# Patient Record
Sex: FEMALE | Race: BLACK OR AFRICAN AMERICAN | NOT HISPANIC OR LATINO | URBAN - METROPOLITAN AREA
[De-identification: names, ages, dates, MRNs, and addresses within clinical notes are randomized per-mention and may not be internally consistent; named-entity substitution may affect disease eponyms.]

---

## 2017-07-31 ENCOUNTER — INPATIENT (INPATIENT)
Facility: HOSPITAL | Age: 62
LOS: 1 days | Discharge: HOME | DRG: 603 | End: 2017-08-02
Attending: HOSPITALIST | Admitting: HOSPITALIST
Payer: COMMERCIAL

## 2017-07-31 VITALS
WEIGHT: 205.03 LBS | OXYGEN SATURATION: 99 % | RESPIRATION RATE: 18 BRPM | TEMPERATURE: 100 F | SYSTOLIC BLOOD PRESSURE: 172 MMHG | HEIGHT: 64 IN | DIASTOLIC BLOOD PRESSURE: 91 MMHG | HEART RATE: 75 BPM

## 2017-07-31 DIAGNOSIS — L03.213 PERIORBITAL CELLULITIS: ICD-10-CM

## 2017-07-31 LAB
ALBUMIN SERPL ELPH-MCNC: 3.7 G/DL — SIGNIFICANT CHANGE UP (ref 3.5–5)
ALP SERPL-CCNC: 120 U/L — SIGNIFICANT CHANGE UP (ref 40–120)
ALT FLD-CCNC: 24 U/L DA — SIGNIFICANT CHANGE UP (ref 10–60)
ANION GAP SERPL CALC-SCNC: 8 MMOL/L — SIGNIFICANT CHANGE UP (ref 5–17)
APTT BLD: 32.5 SEC — SIGNIFICANT CHANGE UP (ref 27.5–37.4)
AST SERPL-CCNC: 21 U/L — SIGNIFICANT CHANGE UP (ref 10–40)
BASOPHILS # BLD AUTO: 0.1 K/UL — SIGNIFICANT CHANGE UP (ref 0–0.2)
BASOPHILS NFR BLD AUTO: 1.2 % — SIGNIFICANT CHANGE UP (ref 0–2)
BILIRUB SERPL-MCNC: 0.2 MG/DL — SIGNIFICANT CHANGE UP (ref 0.2–1.2)
BUN SERPL-MCNC: 14 MG/DL — SIGNIFICANT CHANGE UP (ref 7–18)
CALCIUM SERPL-MCNC: 8.9 MG/DL — SIGNIFICANT CHANGE UP (ref 8.4–10.5)
CHLORIDE SERPL-SCNC: 103 MMOL/L — SIGNIFICANT CHANGE UP (ref 96–108)
CO2 SERPL-SCNC: 27 MMOL/L — SIGNIFICANT CHANGE UP (ref 22–31)
CREAT SERPL-MCNC: 0.93 MG/DL — SIGNIFICANT CHANGE UP (ref 0.5–1.3)
EOSINOPHIL # BLD AUTO: 0.2 K/UL — SIGNIFICANT CHANGE UP (ref 0–0.5)
EOSINOPHIL NFR BLD AUTO: 2.6 % — SIGNIFICANT CHANGE UP (ref 0–6)
GLUCOSE SERPL-MCNC: 103 MG/DL — HIGH (ref 70–99)
HCG SERPL-ACNC: 1 MIU/ML — SIGNIFICANT CHANGE UP
HCG UR QL: NEGATIVE — SIGNIFICANT CHANGE UP
HCT VFR BLD CALC: 41.7 % — SIGNIFICANT CHANGE UP (ref 34.5–45)
HGB BLD-MCNC: 13.7 G/DL — SIGNIFICANT CHANGE UP (ref 11.5–15.5)
INR BLD: 0.92 RATIO — SIGNIFICANT CHANGE UP (ref 0.88–1.16)
LYMPHOCYTES # BLD AUTO: 1.8 K/UL — SIGNIFICANT CHANGE UP (ref 1–3.3)
LYMPHOCYTES # BLD AUTO: 26.1 % — SIGNIFICANT CHANGE UP (ref 13–44)
MCHC RBC-ENTMCNC: 28.2 PG — SIGNIFICANT CHANGE UP (ref 27–34)
MCHC RBC-ENTMCNC: 32.8 GM/DL — SIGNIFICANT CHANGE UP (ref 32–36)
MCV RBC AUTO: 86.2 FL — SIGNIFICANT CHANGE UP (ref 80–100)
MONOCYTES # BLD AUTO: 0.4 K/UL — SIGNIFICANT CHANGE UP (ref 0–0.9)
MONOCYTES NFR BLD AUTO: 5.4 % — SIGNIFICANT CHANGE UP (ref 2–14)
NEUTROPHILS # BLD AUTO: 4.4 K/UL — SIGNIFICANT CHANGE UP (ref 1.8–7.4)
NEUTROPHILS NFR BLD AUTO: 64.6 % — SIGNIFICANT CHANGE UP (ref 43–77)
PLATELET # BLD AUTO: 214 K/UL — SIGNIFICANT CHANGE UP (ref 150–400)
POTASSIUM SERPL-MCNC: 3.5 MMOL/L — SIGNIFICANT CHANGE UP (ref 3.5–5.3)
POTASSIUM SERPL-SCNC: 3.5 MMOL/L — SIGNIFICANT CHANGE UP (ref 3.5–5.3)
PROT SERPL-MCNC: 8 G/DL — SIGNIFICANT CHANGE UP (ref 6–8.3)
PROTHROM AB SERPL-ACNC: 10 SEC — SIGNIFICANT CHANGE UP (ref 9.8–12.7)
RBC # BLD: 4.84 M/UL — SIGNIFICANT CHANGE UP (ref 3.8–5.2)
RBC # FLD: 13.2 % — SIGNIFICANT CHANGE UP (ref 10.3–14.5)
SODIUM SERPL-SCNC: 138 MMOL/L — SIGNIFICANT CHANGE UP (ref 135–145)
WBC # BLD: 6.8 K/UL — SIGNIFICANT CHANGE UP (ref 3.8–10.5)
WBC # FLD AUTO: 6.8 K/UL — SIGNIFICANT CHANGE UP (ref 3.8–10.5)

## 2017-07-31 PROCEDURE — 70481 CT ORBIT/EAR/FOSSA W/DYE: CPT | Mod: 26

## 2017-07-31 PROCEDURE — 99285 EMERGENCY DEPT VISIT HI MDM: CPT

## 2017-07-31 RX ORDER — PIPERACILLIN AND TAZOBACTAM 4; .5 G/20ML; G/20ML
3.38 INJECTION, POWDER, LYOPHILIZED, FOR SOLUTION INTRAVENOUS ONCE
Qty: 0 | Refills: 0 | Status: COMPLETED | OUTPATIENT
Start: 2017-07-31 | End: 2017-07-31

## 2017-07-31 RX ORDER — ACETAMINOPHEN 500 MG
650 TABLET ORAL ONCE
Qty: 0 | Refills: 0 | Status: COMPLETED | OUTPATIENT
Start: 2017-07-31 | End: 2017-07-31

## 2017-07-31 RX ADMIN — PIPERACILLIN AND TAZOBACTAM 200 GRAM(S): 4; .5 INJECTION, POWDER, LYOPHILIZED, FOR SOLUTION INTRAVENOUS at 22:49

## 2017-07-31 NOTE — ED PROVIDER NOTE - SKIN, MLM
Skin normal color for race, warm, dry and intact. No evidence of rash. Skin normal color for race, warm, dry and intact. 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

## 2017-07-31 NOTE — ED PROVIDER NOTE - EYES, MLM
Clear bilaterally, pupils equal, round and reactive to light. Rt periorbital swelling with erythema, sclera- erythema, unable to close eye

## 2017-07-31 NOTE — ED PROVIDER NOTE - OBJECTIVE STATEMENT
Patient u Patient is a 62 year old female from home past medical history of hypertension and gout who  was sent from urgent care center for evaluation of swelling and redness of right eye. She said that she noticed her eye itch at 4pm and when she checked herself in the mirror it was red and swollen. She has had a similar episode in the past, applied vicks and 'it drained away overnight' as per patient. Reports subjective fever but no headache, discharge from eye, blurred vision, change in vision, chest pain, abdominal pain, nausea, vomiting or diarrhea.

## 2017-07-31 NOTE — ED ADULT NURSE NOTE - OBJECTIVE STATEMENT
Patient complains of itchiness, redness to right eye x today. No bleeding or discharge noted. Denies blurry vision, changes in vision. Moving all extremities. Breathing easy and unlabored, no use of accessory muscles.

## 2017-07-31 NOTE — ED PROVIDER NOTE - ATTENDING CONTRIBUTION TO CARE
62 y.o. female claims she thought she was bitten by something this afternoon, pt rubbed her Rt eye, noted periorbital edema, also sclera swell up, itchiness, no blurred vision, no pain, fever.  Seen @ Urgent Care, sent to ED to r/o orbital cellulitis 62 y.o. female claims she thought she was bitten by something this afternoon, pt rubbed her Rt eye, noted periorbital edema, also sclera swell up, itchiness, no blurred vision, no pain, fever.  Seen @ Urgent Care, sent to ED to r/o orbital cellulitis.  PE: in mild distress, Eye: Rt eye lat area tiny lac seen, no bleeding, EOMI, visual acuity- 20/20 with corrective glasses, NT to palp.,  pt with periorbital cellulitis, will get labs, CT orbit, give antibiotics

## 2017-08-01 DIAGNOSIS — I10 ESSENTIAL (PRIMARY) HYPERTENSION: ICD-10-CM

## 2017-08-01 DIAGNOSIS — Z29.9 ENCOUNTER FOR PROPHYLACTIC MEASURES, UNSPECIFIED: ICD-10-CM

## 2017-08-01 DIAGNOSIS — L03.213 PERIORBITAL CELLULITIS: ICD-10-CM

## 2017-08-01 LAB
ALBUMIN SERPL ELPH-MCNC: 3.1 G/DL — LOW (ref 3.5–5)
ALP SERPL-CCNC: 94 U/L — SIGNIFICANT CHANGE UP (ref 40–120)
ALT FLD-CCNC: 20 U/L DA — SIGNIFICANT CHANGE UP (ref 10–60)
ANION GAP SERPL CALC-SCNC: 7 MMOL/L — SIGNIFICANT CHANGE UP (ref 5–17)
AST SERPL-CCNC: 15 U/L — SIGNIFICANT CHANGE UP (ref 10–40)
BASOPHILS # BLD AUTO: 0.1 K/UL — SIGNIFICANT CHANGE UP (ref 0–0.2)
BASOPHILS NFR BLD AUTO: 1 % — SIGNIFICANT CHANGE UP (ref 0–2)
BILIRUB SERPL-MCNC: 0.4 MG/DL — SIGNIFICANT CHANGE UP (ref 0.2–1.2)
BUN SERPL-MCNC: 9 MG/DL — SIGNIFICANT CHANGE UP (ref 7–18)
CALCIUM SERPL-MCNC: 8.5 MG/DL — SIGNIFICANT CHANGE UP (ref 8.4–10.5)
CHLORIDE SERPL-SCNC: 108 MMOL/L — SIGNIFICANT CHANGE UP (ref 96–108)
CO2 SERPL-SCNC: 26 MMOL/L — SIGNIFICANT CHANGE UP (ref 22–31)
CREAT SERPL-MCNC: 0.78 MG/DL — SIGNIFICANT CHANGE UP (ref 0.5–1.3)
EOSINOPHIL # BLD AUTO: 0.2 K/UL — SIGNIFICANT CHANGE UP (ref 0–0.5)
EOSINOPHIL NFR BLD AUTO: 2.4 % — SIGNIFICANT CHANGE UP (ref 0–6)
GLUCOSE SERPL-MCNC: 95 MG/DL — SIGNIFICANT CHANGE UP (ref 70–99)
HCT VFR BLD CALC: 38.6 % — SIGNIFICANT CHANGE UP (ref 34.5–45)
HGB BLD-MCNC: 12.5 G/DL — SIGNIFICANT CHANGE UP (ref 11.5–15.5)
LYMPHOCYTES # BLD AUTO: 1.3 K/UL — SIGNIFICANT CHANGE UP (ref 1–3.3)
LYMPHOCYTES # BLD AUTO: 20 % — SIGNIFICANT CHANGE UP (ref 13–44)
MAGNESIUM SERPL-MCNC: 1.9 MG/DL — SIGNIFICANT CHANGE UP (ref 1.6–2.6)
MCHC RBC-ENTMCNC: 27.9 PG — SIGNIFICANT CHANGE UP (ref 27–34)
MCHC RBC-ENTMCNC: 32.4 GM/DL — SIGNIFICANT CHANGE UP (ref 32–36)
MCV RBC AUTO: 86.2 FL — SIGNIFICANT CHANGE UP (ref 80–100)
MONOCYTES # BLD AUTO: 0.5 K/UL — SIGNIFICANT CHANGE UP (ref 0–0.9)
MONOCYTES NFR BLD AUTO: 7.2 % — SIGNIFICANT CHANGE UP (ref 2–14)
NEUTROPHILS # BLD AUTO: 4.6 K/UL — SIGNIFICANT CHANGE UP (ref 1.8–7.4)
NEUTROPHILS NFR BLD AUTO: 69.3 % — SIGNIFICANT CHANGE UP (ref 43–77)
PHOSPHATE SERPL-MCNC: 3.1 MG/DL — SIGNIFICANT CHANGE UP (ref 2.5–4.5)
PLATELET # BLD AUTO: 174 K/UL — SIGNIFICANT CHANGE UP (ref 150–400)
POTASSIUM SERPL-MCNC: 3.3 MMOL/L — LOW (ref 3.5–5.3)
POTASSIUM SERPL-SCNC: 3.3 MMOL/L — LOW (ref 3.5–5.3)
PROT SERPL-MCNC: 7 G/DL — SIGNIFICANT CHANGE UP (ref 6–8.3)
RBC # BLD: 4.47 M/UL — SIGNIFICANT CHANGE UP (ref 3.8–5.2)
RBC # FLD: 13.2 % — SIGNIFICANT CHANGE UP (ref 10.3–14.5)
SODIUM SERPL-SCNC: 141 MMOL/L — SIGNIFICANT CHANGE UP (ref 135–145)
WBC # BLD: 6.6 K/UL — SIGNIFICANT CHANGE UP (ref 3.8–10.5)
WBC # FLD AUTO: 6.6 K/UL — SIGNIFICANT CHANGE UP (ref 3.8–10.5)

## 2017-08-01 RX ORDER — AMPICILLIN SODIUM AND SULBACTAM SODIUM 250; 125 MG/ML; MG/ML
3 INJECTION, POWDER, FOR SUSPENSION INTRAMUSCULAR; INTRAVENOUS ONCE
Qty: 0 | Refills: 0 | Status: COMPLETED | OUTPATIENT
Start: 2017-08-01 | End: 2017-08-01

## 2017-08-01 RX ORDER — COLCHICINE 0.6 MG
0.6 TABLET ORAL
Qty: 0 | Refills: 0 | Status: DISCONTINUED | OUTPATIENT
Start: 2017-08-01 | End: 2017-08-02

## 2017-08-01 RX ORDER — AMPICILLIN SODIUM AND SULBACTAM SODIUM 250; 125 MG/ML; MG/ML
INJECTION, POWDER, FOR SUSPENSION INTRAMUSCULAR; INTRAVENOUS
Qty: 0 | Refills: 0 | Status: DISCONTINUED | OUTPATIENT
Start: 2017-08-01 | End: 2017-08-02

## 2017-08-01 RX ORDER — AMPICILLIN SODIUM AND SULBACTAM SODIUM 250; 125 MG/ML; MG/ML
3 INJECTION, POWDER, FOR SUSPENSION INTRAMUSCULAR; INTRAVENOUS EVERY 6 HOURS
Qty: 0 | Refills: 0 | Status: DISCONTINUED | OUTPATIENT
Start: 2017-08-01 | End: 2017-08-02

## 2017-08-01 RX ORDER — POTASSIUM CHLORIDE 20 MEQ
40 PACKET (EA) ORAL EVERY 4 HOURS
Qty: 0 | Refills: 0 | Status: COMPLETED | OUTPATIENT
Start: 2017-08-01 | End: 2017-08-01

## 2017-08-01 RX ORDER — METOPROLOL TARTRATE 50 MG
50 TABLET ORAL DAILY
Qty: 0 | Refills: 0 | Status: DISCONTINUED | OUTPATIENT
Start: 2017-08-01 | End: 2017-08-02

## 2017-08-01 RX ADMIN — Medication 2.5 MILLIGRAM(S): at 23:28

## 2017-08-01 RX ADMIN — AMPICILLIN SODIUM AND SULBACTAM SODIUM 200 GRAM(S): 250; 125 INJECTION, POWDER, FOR SUSPENSION INTRAMUSCULAR; INTRAVENOUS at 23:28

## 2017-08-01 RX ADMIN — Medication 40 MILLIEQUIVALENT(S): at 16:07

## 2017-08-01 RX ADMIN — AMPICILLIN SODIUM AND SULBACTAM SODIUM 200 GRAM(S): 250; 125 INJECTION, POWDER, FOR SUSPENSION INTRAMUSCULAR; INTRAVENOUS at 18:00

## 2017-08-01 RX ADMIN — Medication 40 MILLIEQUIVALENT(S): at 11:59

## 2017-08-01 RX ADMIN — AMPICILLIN SODIUM AND SULBACTAM SODIUM 200 GRAM(S): 250; 125 INJECTION, POWDER, FOR SUSPENSION INTRAMUSCULAR; INTRAVENOUS at 12:00

## 2017-08-01 RX ADMIN — Medication 0.6 MILLIGRAM(S): at 08:16

## 2017-08-01 RX ADMIN — Medication 50 MILLIGRAM(S): at 08:16

## 2017-08-01 RX ADMIN — Medication 2.5 MILLIGRAM(S): at 13:45

## 2017-08-01 NOTE — H&P ADULT - NSHPPHYSICALEXAM_GEN_ALL_CORE
GENERAL: Well developed, Well nourished white female, NAD  HEENT:  Normocephalic/Atraumatic, reactive light reflex, moist mucous membranes, nonpalpable lymph nodes, no thyromegaly  NECK: Supple, no JVD  RESP: Symmetric movement of the chest, clear to auscultation bilaterally, no wheeze, no rhonchi, no crackles noted  CVS: + irregular rate and rhythm, S1 and S2 audible, no murmur, rubs or gallops noted  GI: Normal active bowel sounds present, abdomen soft, non tender, non distended, without scar marks, no hepatosleenomegaly  EXTREMITIES:  1+ edema, no clubbing, cyanosis , +varicosities/PVD noted  MSK: 5/5 strength bilateral upper and lower extremities, intact sensations to light & crude touch  PSYCH: Normal mood, normal affect observed  NEURO: alert and oriented x 3, gait steady, speech clear, no focal deficits noted  SKIN: Right groin procedure site CDI.  no bleeding, no hematoma, no rashes, site soft, non tender, positive pedal pulses bilaterally  TUBES: GENERAL: Well developed, Well nourished white female, NAD  HEENT:  Normocephalic/Atraumatic, reactive light reflex, erythematous Rt. conjunctiva, swollen right eyelid with perserved eye movements  NECK: Supple, no JVD  RESP: Symmetric movement of the chest, clear to auscultation bilaterally, no wheeze, no rhonchi, no crackles noted  CVS: S1 and S2 audible, no murmur, rubs or gallops noted  GI: Normal active bowel sounds present, abdomen soft, non tender, non distended  EXTREMITIES: no clubbing, cyanosis  MSK: 5/5 strength bilateral upper and lower extremities, intact sensations to light touch  PSYCH: Normal mood, normal affect observed  NEURO: alert and oriented x 3, gait steady, speech clear, no focal deficits noted GENERAL: Well developed, Well nourished white female, NAD  HEENT:  Normocephalic/Atraumatic, reactive light reflex, erythematous Rt. conjunctiva, swollen right eyelid with preserved eye movements  NECK: Supple, no JVD  RESP: Symmetric movement of the chest, clear to auscultation bilaterally, no wheeze, no rhonchi, no crackles noted  CVS: S1 and S2 audible, no murmur, rubs or gallops noted  GI: Normal active bowel sounds present, abdomen soft, non tender, non distended  EXTREMITIES: no clubbing, cyanosis  MSK: 5/5 strength bilateral upper and lower extremities, intact sensations to light touch  PSYCH: Normal mood, normal affect observed  NEURO: alert and oriented x 3, gait steady, speech clear, no focal deficits noted

## 2017-08-01 NOTE — H&P ADULT - HISTORY OF PRESENT ILLNESS
63 Yo Female from home with PMH of hypertension and gout who was sent from urgent care center for evaluation of swelling and redness of right eye. She said that she noticed her Rt eye itching started suddenly at 4pm on 7/31 and when she checked herself in the mirror it was red and swollen. The eye pain was non radiating, without alleviating and aggravating factors. Pt thinks it to be related to some insect bite but can't recall it properly. She has had a similar episode in the past, applied vicks and 'it drained away overnight' as per patient. Pt also has mild fevers without chills and rigors. She denies headache, discharge from eye, blurred vision, change in vision, difficulty in eye movement, chest pain, abdominal pain, nausea, vomiting or diarrhea.     Pt has a history of b/l LE cellulitis 9 years ago treated with antibiotics.     In ED her /91 with HR of 75. Temp 99 61 Yo Female from home with PMH of hypertension and gout who was sent from urgent care center for evaluation of swelling and redness of right eye. She said that she noticed her Rt eye itching started suddenly at 4pm on 7/31 and when she checked herself in the mirror it was red and swollen. The eye pain was non radiating, without alleviating and aggravating factors. Pt thinks it to be related to some insect bite but can't recall it properly. She has had a similar episode in the past, applied vicks and 'it drained away overnight' as per patient. Pt also has mild fevers without chills and rigors. She denies headache, discharge from eye, blurred vision, change in vision, difficulty in eye movement, chest pain, abdominal pain, nausea, vomiting or diarrhea.     Pt has a history of b/l LE cellulitis 9 years ago treated with antibiotics.     In ED her /91 with HR of 75. Temp 99. Given a dose of zosyn. 63 Yo Female from home with PMH of hypertension and gout who was sent from urgent care center for evaluation of swelling and redness of right eye. She said that she noticed her Rt eye itching started suddenly at 4pm on 7/31 and when she checked herself in the mirror it was red and swollen. The eye pain was non radiating, without alleviating and aggravating factors. There is some discharge and crusting. Pt thinks it to be related to some insect bite but can't recall it properly. She has had a similar episode in the past around 7 months ago, applied vicks and 'it drained away overnight' as per patient. Patient also has mild fevers without chills and rigors. She denies headache, blurred vision, change in vision, difficulty in eye movement, chest pain, abdominal pain, nausea, vomiting or diarrhea.     Pt has a history of b/l LE cellulitis 9 years ago treated with antibiotics.     In ED her /91 with HR of 75. Temp 99. Given a dose of zosyn.

## 2017-08-01 NOTE — H&P ADULT - NSHPREVIEWOFSYSTEMS_GEN_ALL_CORE
CONSTITUTIONAL: No weakness, mild fever  EYES/ENT: No visual changes, red swollen Rt eyelid;  No vertigo or throat pain   NECK: dryness of throat, No pain or stiffness  RESPIRATORY: No cough, wheezing, hemoptysis; No shortness of breath  CARDIOVASCULAR: No chest pain, mild palpitations  GASTROINTESTINAL: No abdominal or epigastric pain. No nausea, vomiting, or hematemesis; No diarrhea or constipation. No melena or hematochezia.  GENITOURINARY: No dysuria, frequency or hematuria  NEUROLOGICAL: No numbness or weakness, left knee and foot pain  SKIN: No itching, burning, rashes, or lesions   All other review of systems is negative unless indicated above. CONSTITUTIONAL: No weakness, mild fever  EYES/ENT: No visual changes, red swollen Rt eyelid with crusting;  No vertigo or throat pain   NECK: dryness of throat, No pain or stiffness  RESPIRATORY: No cough, wheezing, hemoptysis; No shortness of breath  CARDIOVASCULAR: No chest pain, mild palpitations  GASTROINTESTINAL: No abdominal or epigastric pain. No nausea, vomiting, or hematemesis; No diarrhea or constipation. No melena or hematochezia.  GENITOURINARY: No dysuria, frequency or hematuria  NEUROLOGICAL: No numbness or weakness, left knee and foot pain  SKIN: No itching, burning, rashes, or lesions   All other review of systems is negative unless indicated above.

## 2017-08-01 NOTE — CONSULT NOTE ADULT - SUBJECTIVE AND OBJECTIVE BOX
HPI:  Pt is a 63y/o Female, from home, with PMH of Vitiligo, hypertension and gout who presents from urgent care with swelling and redness of the right eye x 1 day. Pt states Rt eye became itchy yesterday afternoon with burning sensation, and when she looked in the mirror it was red and swollen.  Pt thinks it could be related to an insect bite, and states 7 months prior she had similar symptoms at which point she applied vicks vaporub and 'it drained away overnight'.  Patient also reports mild fevers without chills, or rigor, and denies any visual disturbances.  Today pt presents in no acute distress, resting comfortably in bed.  Pt states eye feels much better, and burning has subsided.  Pt still experiencing mild itching, but denies any visual disturbances, or pain.   Otherwise pt has no new complaints today, and denies headache, change in vision, difficulty in eye movement, chest pain, abdominal pain, or N/V/D.     PAST MEDICAL & SURGICAL HISTORY:  Psoriasis  Cellulitis: bilateral lower exts  HTN (hypertension)  Pt has a history of b/l LE cellulitis 9 years ago treated with antibiotics.     No significant past surgical history    No Known Allergies    MEDICATIONS  (STANDING):  metoprolol succinate ER 50 milliGRAM(s) Oral daily  enalapril 2.5 milliGRAM(s) Oral daily  ampicillin/sulbactam  IVPB   IV Intermittent   ampicillin/sulbactam  IVPB 3 Gram(s) IV Intermittent once  ampicillin/sulbactam  IVPB 3 Gram(s) IV Intermittent every 6 hours  potassium chloride    Tablet ER 40 milliEquivalent(s) Oral every 4 hours    MEDICATIONS  (PRN):  colchicine 0.6 milliGRAM(s) Oral two times a day PRN joint pain      SOCIAL HISTORY:  Smoker:  NO        ETOH use:  NO         :  Ilicit Drug use:  NO  Occupation:    FAMILY HISTORY:  Family history of hypertension (Mother)      Vital Signs Last 24 Hrs  T(C): 37 (01 Aug 2017 09:57), Max: 37.6 (31 Jul 2017 18:46)  T(F): 98.6 (01 Aug 2017 09:57), Max: 99.7 (31 Jul 2017 18:46)  HR: 81 (01 Aug 2017 09:57) (75 - 91)  BP: 167/92 (01 Aug 2017 09:57) (150/88 - 177/100)  BP(mean): --  RR: 16 (01 Aug 2017 09:57) (16 - 18)  SpO2: 98% (01 Aug 2017 09:57) (98% - 100%)    LABS/DIAGNOSTIC TESTS:                          12.5   6.6   )-----------( 174      ( 01 Aug 2017 10:53 )             38.6     WBC Count: 6.6 K/uL (08-01 @ 10:53)  WBC Count: 6.8 K/uL (07-31 @ 20:53)      08-01    141  |  108  |  9   ----------------------------<  95  3.3<L>   |  26  |  0.78    Ca    8.5      01 Aug 2017 08:43  Phos  3.1     08-01  Mg     1.9     08-01    TPro  7.0  /  Alb  3.1<L>  /  TBili  0.4  /  DBili  x   /  AST  15  /  ALT  20  /  AlkPhos  94  08-01        LIVER FUNCTIONS - ( 01 Aug 2017 08:43 )  Alb: 3.1 g/dL / Pro: 7.0 g/dL / ALK PHOS: 94 U/L / ALT: 20 U/L DA / AST: 15 U/L / GGT: x           PT/INR - ( 31 Jul 2017 20:53 )   PT: 10.0 sec;   INR: 0.92 ratio       PTT - ( 31 Jul 2017 20:53 )  PTT:32.5 sec    LACTATE:    ABG -     CULTURES:       RADIOLOGY:      ROS  [  ] UNABLE TO ELICIT HPI:  Pt is a 63y/o Female, from home, with PMH of Vitiligo, hypertension and gout who presents from urgent care with swelling and redness of the right eye x 1 day. Pt states Rt eye became itchy yesterday afternoon with burning sensation, and when she looked in the mirror it was red and swollen.  Pt thinks it could be related to an insect bite, and states 7 months prior she had similar symptoms at which point she applied vicks vaporub and 'it drained away overnight'.  Patient also reports mild fevers without chills, or rigor, and denies any visual disturbances.  Today pt presents in no acute distress, resting comfortably in bed.  Pt states eye feels much better, and burning has subsided.  Pt still experiencing mild itching, but denies any visual disturbances, or pain.   Otherwise pt has no new complaints today, and denies headache, change in vision, difficulty in eye movement, chest pain, abdominal pain, or N/V/D.     REVIEW OF SYSTEMS:  CONSTITUTIONAL: No fever,   EYES: no acute visual disturbances, no pain, +itching of rt eye  NECK: No pain or stiffness  RESPIRATORY: No cough; No shortness of breath  CARDIOVASCULAR: No chest pain, no palpitations  GASTROINTESTINAL: No pain. No nausea or vomiting; No diarrhea   NEUROLOGICAL: No headache or numbness, no tremors  MUSCULOSKELETAL: No joint pain, no muscle pain  SKIN: Vitiligo  GENITOURINARY: no dysuria, no frequency, no hesitancy  PSYCHIATRY: no depression , no anxiety  ALL OTHER  ROS negative        PAST MEDICAL & SURGICAL HISTORY:  Vitiligo  Psoriasis  Cellulitis: bilateral lower exts  HTN (hypertension)  Pt has a history of b/l LE cellulitis 9 years ago treated with antibiotics.     No significant past surgical history    No Known Allergies    MEDICATIONS  (STANDING):  metoprolol succinate ER 50 milliGRAM(s) Oral daily  enalapril 2.5 milliGRAM(s) Oral daily  ampicillin/sulbactam  IVPB   IV Intermittent   ampicillin/sulbactam  IVPB 3 Gram(s) IV Intermittent once  ampicillin/sulbactam  IVPB 3 Gram(s) IV Intermittent every 6 hours  potassium chloride    Tablet ER 40 milliEquivalent(s) Oral every 4 hours    MEDICATIONS  (PRN):  colchicine 0.6 milliGRAM(s) Oral two times a day PRN joint pain      SOCIAL HISTORY:  Smoker:  NO        ETOH use:  NO         :  Ilicit Drug use:  NO  Occupation:    FAMILY HISTORY:  Family history of hypertension (Mother)      Vital Signs Last 24 Hrs  T(C): 37 (01 Aug 2017 09:57), Max: 37.6 (31 Jul 2017 18:46)  T(F): 98.6 (01 Aug 2017 09:57), Max: 99.7 (31 Jul 2017 18:46)  HR: 81 (01 Aug 2017 09:57) (75 - 91)  BP: 167/92 (01 Aug 2017 09:57) (150/88 - 177/100)  BP(mean): --  RR: 16 (01 Aug 2017 09:57) (16 - 18)  SpO2: 98% (01 Aug 2017 09:57) (98% - 100%)  ________________________________________________  PHYSICAL EXAM:  GENERAL: NAD  HEENT: Normocephalic; erythematous conjunctivae, sclerae clear; bro orbital erythema; vision intact;  moist mucous membranes;   NECK : supple  CHEST/LUNG: Clear to auscultation bilaterally with good air entry   HEART: S1 S2  regular; no murmurs, gallops or rubs  ABDOMEN: Soft, Nontender, Nondistended; Bowel sounds present  EXTREMITIES: no cyanosis; no edema; no calf tenderness  SKIN: Vitiligo; Bilateral lower leg keloid scars from prior cellulitic episode  NERVOUS SYSTEM:  Awake and alert; Oriented  to place, person and time ; no new deficits    LABS/DIAGNOSTIC TESTS:                      12.5   6.6   )-----------( 174      ( 01 Aug 2017 10:53 )             38.6     WBC Count: 6.6 K/uL (08-01 @ 10:53)  WBC Count: 6.8 K/uL (07-31 @ 20:53)      08-01    141  |  108  |  9   ----------------------------<  95  3.3<L>   |  26  |  0.78    Ca    8.5      01 Aug 2017 08:43  Phos  3.1     08-01  Mg     1.9     08-01    TPro  7.0  /  Alb  3.1<L>  /  TBili  0.4  /  DBili  x   /  AST  15  /  ALT  20  /  AlkPhos  94  08-01        LIVER FUNCTIONS - ( 01 Aug 2017 08:43 )  Alb: 3.1 g/dL / Pro: 7.0 g/dL / ALK PHOS: 94 U/L / ALT: 20 U/L DA / AST: 15 U/L / GGT: x           PT/INR - ( 31 Jul 2017 20:53 )   PT: 10.0 sec;   INR: 0.92 ratio       PTT - ( 31 Jul 2017 20:53 )  PTT:32.5 sec    CULTURES: Pending       RADIOLOGY:  CT Orbit w/ IV Cont (07.31.17 @ 22:09) >    IMPRESSION:   Mild right preseptal, inferior periorbital cellulitis. No evidence of a   postseptal cellulitis.          ROS  [  ] UNABLE TO ELICIT HPI:  Pt is a 63y/o Female, from home, with PMH of Vitiligo, hypertension and gout who presents from urgent care with swelling and redness of the right eye x 1 day. Pt states Rt eye became itchy yesterday afternoon with burning sensation, and when she looked in the mirror it was red and swollen.  Pt thinks it could be related to an insect bite, and states 7 months prior she had similar symptoms at which point she applied vicks vaporub and 'it drained away overnight'.  Patient also reports mild fevers without chills, or rigor, and denies any visual disturbances.  Today pt presents in no acute distress, resting comfortably in bed.  Pt states eye feels much better, and burning has subsided.  Pt still experiencing mild itching, but denies any visual disturbances, or pain.   Otherwise pt has no new complaints today, and denies headache, change in vision, difficulty in eye movement, chest pain, abdominal pain, or N/V/D.     REVIEW OF SYSTEMS:  CONSTITUTIONAL: No fever,   EYES: no acute visual disturbances, no pain, +itching of rt eye  NECK: No pain or stiffness  RESPIRATORY: No cough; No shortness of breath  CARDIOVASCULAR: No chest pain, no palpitations  GASTROINTESTINAL: No pain. No nausea or vomiting; No diarrhea   NEUROLOGICAL: No headache or numbness, no tremors  MUSCULOSKELETAL: No joint pain, no muscle pain  SKIN: Vitiligo  GENITOURINARY: no dysuria, no frequency, no hesitancy  PSYCHIATRY: no depression , no anxiety  ALL OTHER  ROS negative        PAST MEDICAL & SURGICAL HISTORY:  Vitiligo  Psoriasis  Cellulitis: bilateral lower exts  HTN (hypertension)  Pt has a history of b/l LE cellulitis 9 years ago treated with antibiotics.     No significant past surgical history    No Known Allergies    MEDICATIONS  (STANDING):  metoprolol succinate ER 50 milliGRAM(s) Oral daily  enalapril 2.5 milliGRAM(s) Oral daily  ampicillin/sulbactam  IVPB   IV Intermittent   ampicillin/sulbactam  IVPB 3 Gram(s) IV Intermittent once  ampicillin/sulbactam  IVPB 3 Gram(s) IV Intermittent every 6 hours  potassium chloride    Tablet ER 40 milliEquivalent(s) Oral every 4 hours    MEDICATIONS  (PRN):  colchicine 0.6 milliGRAM(s) Oral two times a day PRN joint pain      SOCIAL HISTORY:  Smoker:  NO        ETOH use:  NO         :  Ilicit Drug use:  NO  Occupation:   Currently residing with friend      FAMILY HISTORY:  Family history of hypertension (Mother)      Vital Signs Last 24 Hrs  T(C): 37 (01 Aug 2017 09:57), Max: 37.6 (31 Jul 2017 18:46)  T(F): 98.6 (01 Aug 2017 09:57), Max: 99.7 (31 Jul 2017 18:46)  HR: 81 (01 Aug 2017 09:57) (75 - 91)  BP: 167/92 (01 Aug 2017 09:57) (150/88 - 177/100)  BP(mean): --  RR: 16 (01 Aug 2017 09:57) (16 - 18)  SpO2: 98% (01 Aug 2017 09:57) (98% - 100%)  ________________________________________________  PHYSICAL EXAM:  GENERAL: NAD  HEENT: Normocephalic; erythematous conjunctivae, sclerae clear; bro orbital erythema; vision intact;  moist mucous membranes;   NECK : supple  CHEST/LUNG: Clear to auscultation bilaterally with good air entry   HEART: S1 S2  regular; no murmurs, gallops or rubs  ABDOMEN: Soft, Nontender, Nondistended; Bowel sounds present  EXTREMITIES: no cyanosis; no edema; no calf tenderness  SKIN: Vitiligo; Bilateral lower leg keloid scars from prior cellulitic episode  NERVOUS SYSTEM:  Awake and alert; Oriented  to place, person and time ; no new deficits    LABS/DIAGNOSTIC TESTS:                      12.5   6.6   )-----------( 174      ( 01 Aug 2017 10:53 )             38.6     WBC Count: 6.6 K/uL (08-01 @ 10:53)  WBC Count: 6.8 K/uL (07-31 @ 20:53)      08-01    141  |  108  |  9   ----------------------------<  95  3.3<L>   |  26  |  0.78    Ca    8.5      01 Aug 2017 08:43  Phos  3.1     08-01  Mg     1.9     08-01    TPro  7.0  /  Alb  3.1<L>  /  TBili  0.4  /  DBili  x   /  AST  15  /  ALT  20  /  AlkPhos  94  08-01        LIVER FUNCTIONS - ( 01 Aug 2017 08:43 )  Alb: 3.1 g/dL / Pro: 7.0 g/dL / ALK PHOS: 94 U/L / ALT: 20 U/L DA / AST: 15 U/L / GGT: x           PT/INR - ( 31 Jul 2017 20:53 )   PT: 10.0 sec;   INR: 0.92 ratio       PTT - ( 31 Jul 2017 20:53 )  PTT:32.5 sec    CULTURES: Pending       RADIOLOGY:  CT Orbit w/ IV Cont (07.31.17 @ 22:09) >    IMPRESSION:   Mild right preseptal, inferior periorbital cellulitis. No evidence of a   postseptal cellulitis.          ROS  [  ] UNABLE TO ELICIT HPI:  Pt is a 63y/o Female, from home, with PMH of Vitiligo, hypertension and gout who presents from urgent care with swelling and redness of the right eye x 1 day. Pt states Rt eye became itchy yesterday afternoon with burning sensation, and when she looked in the mirror it was red and swollen.  Pt thinks it could be related to an insect bite, and states 7 months prior she had similar symptoms.  Patient also reports mild fevers without chills, or rigor, and denies any visual disturbances.  Today pt presents in no acute distress, resting comfortably in bed.  Pt states eye feels much better, and burning has subsided.  Pt still experiencing mild itching, but denies any visual disturbances, or pain.   Otherwise pt has no new complaints today, and denies headache, change in vision, difficulty in eye movement, chest pain, abdominal pain, or N/V/D.     REVIEW OF SYSTEMS:  CONSTITUTIONAL: No fever,   EYES: no acute visual disturbances, no pain, +itching of rt eye  NECK: No pain or stiffness  RESPIRATORY: No cough; No shortness of breath  CARDIOVASCULAR: No chest pain, no palpitations  GASTROINTESTINAL: No pain. No nausea or vomiting; No diarrhea   NEUROLOGICAL: No headache or numbness, no tremors  MUSCULOSKELETAL: No joint pain, no muscle pain  SKIN: Vitiligo  GENITOURINARY: no dysuria, no frequency, no hesitancy  PSYCHIATRY: no depression , no anxiety  ALL OTHER  ROS negative        PAST MEDICAL & SURGICAL HISTORY:  Vitiligo  Psoriasis  Cellulitis: bilateral lower exts  HTN (hypertension)  Pt has a history of b/l LE cellulitis 9 years ago treated with antibiotics.     No significant past surgical history    No Known Allergies    MEDICATIONS  (STANDING):  metoprolol succinate ER 50 milliGRAM(s) Oral daily  enalapril 2.5 milliGRAM(s) Oral daily  ampicillin/sulbactam  IVPB   IV Intermittent   ampicillin/sulbactam  IVPB 3 Gram(s) IV Intermittent once  ampicillin/sulbactam  IVPB 3 Gram(s) IV Intermittent every 6 hours  potassium chloride    Tablet ER 40 milliEquivalent(s) Oral every 4 hours    MEDICATIONS  (PRN):  colchicine 0.6 milliGRAM(s) Oral two times a day PRN joint pain      SOCIAL HISTORY:  Smoker:  1 cigarette per day     ETOH use:  socially        :  Ilicit Drug use:  NO  Occupation:   Currently residing with friend      FAMILY HISTORY:  Family history of hypertension (Mother)      Vital Signs Last 24 Hrs  T(C): 37 (01 Aug 2017 09:57), Max: 37.6 (31 Jul 2017 18:46)  T(F): 98.6 (01 Aug 2017 09:57), Max: 99.7 (31 Jul 2017 18:46)  HR: 81 (01 Aug 2017 09:57) (75 - 91)  BP: 167/92 (01 Aug 2017 09:57) (150/88 - 177/100)  BP(mean): --  RR: 16 (01 Aug 2017 09:57) (16 - 18)  SpO2: 98% (01 Aug 2017 09:57) (98% - 100%)  ________________________________________________  PHYSICAL EXAM:  GENERAL: NAD  HEENT: Normocephalic; erythematous conjunctivae, sclerae clear; bro orbital erythema; vision intact;  moist mucous membranes; EOMI, LISS, congenital proptosis.  NECK : supple, no lymphadenopathy, no masses, no distended neck veins  CHEST/LUNG: Clear to auscultation bilaterally with good air entry   HEART: S1 S2  regular; no murmurs, gallops or rubs  ABDOMEN: Soft, Nontender, Nondistended; Bowel sounds present  EXTREMITIES: no cyanosis; no edema; no calf tenderness  SKIN: Vitiligo; Bilateral lower leg  scars from prior cellulitic episode  NERVOUS SYSTEM:  Awake and alert; Oriented  to place, person and time ; no new deficits    LABS/DIAGNOSTIC TESTS:                      12.5   6.6   )-----------( 174      ( 01 Aug 2017 10:53 )             38.6     WBC Count: 6.6 K/uL (08-01 @ 10:53)  WBC Count: 6.8 K/uL (07-31 @ 20:53)      08-01    141  |  108  |  9   ----------------------------<  95  3.3<L>   |  26  |  0.78    Ca    8.5      01 Aug 2017 08:43  Phos  3.1     08-01  Mg     1.9     08-01    TPro  7.0  /  Alb  3.1<L>  /  TBili  0.4  /  DBili  x   /  AST  15  /  ALT  20  /  AlkPhos  94  08-01        LIVER FUNCTIONS - ( 01 Aug 2017 08:43 )  Alb: 3.1 g/dL / Pro: 7.0 g/dL / ALK PHOS: 94 U/L / ALT: 20 U/L DA / AST: 15 U/L / GGT: x           PT/INR - ( 31 Jul 2017 20:53 )   PT: 10.0 sec;   INR: 0.92 ratio       PTT - ( 31 Jul 2017 20:53 )  PTT:32.5 sec    CULTURES: Pending       RADIOLOGY:  CT Orbit w/ IV Cont (07.31.17 @ 22:09) >    IMPRESSION:   Mild right preseptal, inferior periorbital cellulitis. No evidence of a   postseptal cellulitis.          ROS  [  ] UNABLE TO ELICIT

## 2017-08-01 NOTE — H&P ADULT - ATTENDING COMMENTS
patient seen and examined at bedside, agree with above.  Patient looks much better today, she has pictures of her right eye yesterday, which shows it was swollen, bulging, with pericorneal conjunctival injection and swelling. She admits to still feeling some discomfort in the lateral aspect of the right eye.  Physical exam: HEENT: the right eye looks ok, no conjunctival injection, no limitation of movement, no pain on movement.  rest as above  A/P:  1- Periseptal cellulitis: ID input appreciated, continue with current regimen  no wbc, no fever overnight  2- Hypertension: continue with lisinpril  rest as above.

## 2017-08-01 NOTE — CONSULT NOTE ADULT - ASSESSMENT
periorbital cellulitis  ? right eye conjunctivitis  plan - cont unasyn 3gms iv q6 hrs  will reeval tomorrow and if doing well will plan to dc home on po abxs

## 2017-08-01 NOTE — H&P ADULT - ASSESSMENT
61 Yo Female from home with PMH of hypertension and gout who was sent from urgent care center for evaluation of swelling and redness of right eye. She said that she noticed her Rt eye itching started suddenly at 4pm on 7/31 and when she checked herself in the mirror it was red and swollen. Pt had a fever of 100 in ED. CT scan of the eye was done which showed Mild right preseptal, inferior periorbital cellulitis. No evidence of a postseptal cellulitis. 61 Yo Female from home with PMH of hypertension and gout who was sent from urgent care center for evaluation of swelling and redness of right eye. She said that she noticed her Rt eye itching started suddenly at 4pm on 7/31 and when she checked herself in the mirror it was red and swollen. Pt had a fever of 99 in ED. CT scan of the eye was done which showed Mild right preseptal, inferior periorbital cellulitis. No evidence of a postseptal cellulitis.

## 2017-08-01 NOTE — H&P ADULT - PROBLEM SELECTOR PLAN 2
BP of 172/91 in ED  - c/w home medications BP of 172/91 in ED  - c/w metoprolol 50 mg BP of 172/91 in ED  - c/w metoprolol 50 mg  - added enalapril 2.5 qday

## 2017-08-01 NOTE — H&P ADULT - NSHPSOCIALHISTORY_GEN_ALL_CORE
Pt living in her friends house denies use of illicit drugs use. Pt drinks alcoholic beverages on weekends occasionally and 1 cigarette every day after work. Patient living in her friends house denies use of illicit drugs use. Pt drinks alcoholic beverages on weekends occasionally and 1 cigarette every day after work.

## 2017-08-01 NOTE — H&P ADULT - PROBLEM SELECTOR PLAN 1
patient has Right erythema of eye associated with periorbital swelling and erythema.  patient had temp of 99F, no leucocytosis.  CT orbit : rt eye pre-septal & inf. periorbital cellulitis.  s/p 1 dose of zosyn in ED.  will start on IV Unasyn 3 gm Q6h  f/u blood cultures.  Consult ID patient has Right erythema of eye associated with periorbital swelling and erythema.  Cellulitis vs conjunctivitis vs foreign body  patient had temp of 99F, no leucocytosis.  CT orbit : rt eye pre-septal & inf. periorbital cellulitis.  s/p 1 dose of zosyn in ED.  will start on IV Unasyn 3 gm Q6h  f/u blood cultures.  Consult ID patient has Right erythema of eye associated with periorbital swelling and erythema.  -Cellulitis vs conjunctivitis vs foreign body  -patient had temp of 99F, no leucocytosis.  -CT orbit : Right eye pre-septal & inf. periorbital cellulitis.  -s/p 1 dose of zosyn in ED.  -will start on IV Unasyn 3 gm Q6h  -f/u blood cultures.  -Consult ID patient has Right erythema of eye associated with periorbital swelling and erythema.  -Cellulitis vs conjunctivitis vs foreign body  -patient had temp of 99F, no leucocytosis.  -CT orbit : Right eye pre-septal & inf. periorbital cellulitis.  -s/p 1 dose of zosyn in ED.  -Started on IV Unasyn 3 gm Q6h  -f/u blood cultures.  -Dr. Lai on board patient has Right erythema of eye associated with periorbital swelling and erythema.  Preseptal cellulitis on CT orbit  -patient had temp of 99F, no leucocytosis.  -CT orbit : Right eye pre-septal & inf. periorbital cellulitis.  -s/p 1 dose of zosyn in ED.  -Started on IV Unasyn 3 gm Q6h  -f/u blood cultures.  -Dr. Lai on board

## 2017-08-02 VITALS
SYSTOLIC BLOOD PRESSURE: 149 MMHG | OXYGEN SATURATION: 100 % | TEMPERATURE: 99 F | HEART RATE: 84 BPM | RESPIRATION RATE: 16 BRPM | DIASTOLIC BLOOD PRESSURE: 86 MMHG

## 2017-08-02 DIAGNOSIS — L80 VITILIGO: ICD-10-CM

## 2017-08-02 LAB
ANION GAP SERPL CALC-SCNC: 7 MMOL/L — SIGNIFICANT CHANGE UP (ref 5–17)
BUN SERPL-MCNC: 10 MG/DL — SIGNIFICANT CHANGE UP (ref 7–18)
CALCIUM SERPL-MCNC: 8.6 MG/DL — SIGNIFICANT CHANGE UP (ref 8.4–10.5)
CHLORIDE SERPL-SCNC: 108 MMOL/L — SIGNIFICANT CHANGE UP (ref 96–108)
CO2 SERPL-SCNC: 27 MMOL/L — SIGNIFICANT CHANGE UP (ref 22–31)
CREAT SERPL-MCNC: 0.83 MG/DL — SIGNIFICANT CHANGE UP (ref 0.5–1.3)
GLUCOSE SERPL-MCNC: 117 MG/DL — HIGH (ref 70–99)
HCT VFR BLD CALC: 37.9 % — SIGNIFICANT CHANGE UP (ref 34.5–45)
HGB BLD-MCNC: 12 G/DL — SIGNIFICANT CHANGE UP (ref 11.5–15.5)
MCHC RBC-ENTMCNC: 28 PG — SIGNIFICANT CHANGE UP (ref 27–34)
MCHC RBC-ENTMCNC: 31.7 GM/DL — LOW (ref 32–36)
MCV RBC AUTO: 88.4 FL — SIGNIFICANT CHANGE UP (ref 80–100)
PLATELET # BLD AUTO: 153 K/UL — SIGNIFICANT CHANGE UP (ref 150–400)
POTASSIUM SERPL-MCNC: 3.5 MMOL/L — SIGNIFICANT CHANGE UP (ref 3.5–5.3)
POTASSIUM SERPL-SCNC: 3.5 MMOL/L — SIGNIFICANT CHANGE UP (ref 3.5–5.3)
RBC # BLD: 4.29 M/UL — SIGNIFICANT CHANGE UP (ref 3.8–5.2)
RBC # FLD: 13.2 % — SIGNIFICANT CHANGE UP (ref 10.3–14.5)
SODIUM SERPL-SCNC: 142 MMOL/L — SIGNIFICANT CHANGE UP (ref 135–145)
WBC # BLD: 5.7 K/UL — SIGNIFICANT CHANGE UP (ref 3.8–10.5)
WBC # FLD AUTO: 5.7 K/UL — SIGNIFICANT CHANGE UP (ref 3.8–10.5)

## 2017-08-02 PROCEDURE — 85730 THROMBOPLASTIN TIME PARTIAL: CPT

## 2017-08-02 PROCEDURE — 85027 COMPLETE CBC AUTOMATED: CPT

## 2017-08-02 PROCEDURE — 84702 CHORIONIC GONADOTROPIN TEST: CPT

## 2017-08-02 PROCEDURE — 81025 URINE PREGNANCY TEST: CPT

## 2017-08-02 PROCEDURE — 87040 BLOOD CULTURE FOR BACTERIA: CPT

## 2017-08-02 PROCEDURE — 83735 ASSAY OF MAGNESIUM: CPT

## 2017-08-02 PROCEDURE — 85610 PROTHROMBIN TIME: CPT

## 2017-08-02 PROCEDURE — 80048 BASIC METABOLIC PNL TOTAL CA: CPT

## 2017-08-02 PROCEDURE — 84100 ASSAY OF PHOSPHORUS: CPT

## 2017-08-02 PROCEDURE — 99285 EMERGENCY DEPT VISIT HI MDM: CPT | Mod: 25

## 2017-08-02 PROCEDURE — 80053 COMPREHEN METABOLIC PANEL: CPT

## 2017-08-02 PROCEDURE — 70481 CT ORBIT/EAR/FOSSA W/DYE: CPT

## 2017-08-02 RX ADMIN — Medication 2.5 MILLIGRAM(S): at 09:44

## 2017-08-02 RX ADMIN — AMPICILLIN SODIUM AND SULBACTAM SODIUM 200 GRAM(S): 250; 125 INJECTION, POWDER, FOR SUSPENSION INTRAMUSCULAR; INTRAVENOUS at 11:39

## 2017-08-02 RX ADMIN — AMPICILLIN SODIUM AND SULBACTAM SODIUM 200 GRAM(S): 250; 125 INJECTION, POWDER, FOR SUSPENSION INTRAMUSCULAR; INTRAVENOUS at 05:53

## 2017-08-02 RX ADMIN — Medication 2.5 MILLIGRAM(S): at 05:53

## 2017-08-02 RX ADMIN — Medication 50 MILLIGRAM(S): at 05:53

## 2017-08-02 NOTE — DISCHARGE NOTE ADULT - PLAN OF CARE
Control blood pressure Please continue to take your home lopressor for Blood pressure control but also take your new medication of Enalapril 5mg twice a day. Please take your medications as prescribed and follow up with your PCP for regular blood pressure monitoring and dose adjustments. Also, please follow up with your PCP for repeat blood testing of your serum creatinine in 1 month's time. For reference, your baseline is 0.83. Resolve infection Please take augmentin for 7 days, once in the morning and once at night to heal your infection. Please follow up with your PCP for evaluation of resolution of infection.

## 2017-08-02 NOTE — PROGRESS NOTE ADULT - ASSESSMENT
61 Yo Female from home with PMH of hypertension and gout who was sent from urgent care center for evaluation of swelling and redness of right eye. She said that she noticed her Rt eye itching started suddenly at 4pm on 7/31 and when she checked herself in the mirror it was red and swollen. Pt had a fever of 99 in ED. CT scan of the eye was done which showed Mild right preseptal, inferior periorbital cellulitis. No evidence of a postseptal cellulitis.
facial cellulitis/periorbital cellulitis  plan -can dc home today on augmentin 875 mgs po bid  x 7 days

## 2017-08-02 NOTE — DISCHARGE NOTE ADULT - HOSPITAL COURSE
61 Yo Female from home with PMH of hypertension and gout who was sent from urgent care center for evaluation of swelling and redness of right eye. She said that she noticed her Rt eye itching started suddenly at 4pm on 7/31 and when she checked herself in the mirror it was red and swollen. The eye pain was non radiating, without alleviating and aggravating factors. There is some discharge and crusting. Pt thinks it to be related to some insect bite but can't recall it properly. She has had a similar episode in the past around 7 months ago, applied vicks and 'it drained away overnight' as per patient. Patient also has mild fevers without chills and rigors. She denies headache, blurred vision, change in vision, difficulty in eye movement, chest pain, abdominal pain, nausea, vomiting or diarrhea.     In the hospital, patient was treated for periorbital cellulitis. Patient had a CT scan of the eye to evaluate for extent of infection. CT scan showed only pre-septal and periorbital infection. Patient was treated in the hospital with Unasyn for the infection and it greatly improved. Patient was seen by infectious disease specialist Dr. Lai who evaluated the patient and determined that antibiotics can be changed to PO Augmentin to finish treatment course. Patient's blood pressure was elevated so medications were evaluated and Enalapril was added for improved blood pressure control. Patient is now medically stable for discharge, plan discussed and agreed with Dr. Garo Ly.

## 2017-08-02 NOTE — PROGRESS NOTE ADULT - SUBJECTIVE AND OBJECTIVE BOX
Patient is a 62y old  Female who presents with a chief complaint of Right eye pain and swelling (02 Aug 2017 12:48)    INTERVAL HPI/OVERNIGHT EVENTS:  Patient is seen and examined at bedside, feeling ok, continues to feel better,  Seen by ID and cleared for discharge home on oral antibiotics.   Her BP has been elevated last night, and her enalapril adjusted to twice daily.    Allergies    No Known Allergies    Intolerances    REVIEW OF SYSTEMS:  CONSTITUTIONAL: No fever, weight loss, or fatigue  EYES: No eye pain, visual disturbances, or discharge  RESPIRATORY: No cough, wheezing or shortness of breath  CARDIOVASCULAR: No chest pain, feeling of heart beats  GASTROINTESTINAL: No abdominal or epigastric pain. No nausea, vomiting; No diarrhea or constipation.  GENITOURINARY: No dysuria, frequency, hematuria  NEUROLOGICAL: No headaches  MUSCULOSKELETAL: No joint pain  PSYCHIATRIC: No depression or anxiety  HEME/LYMPH: No easy bruising, or bleeding gums  ALLERGY AND IMMUNOLOGIC: No hives or eczema    Medications:  colchicine 0.6 milliGRAM(s) Oral two times a day PRN joint pain  metoprolol succinate ER 50 milliGRAM(s) Oral daily  ampicillin/sulbactam  IVPB   IV Intermittent   ampicillin/sulbactam  IVPB 3 Gram(s) IV Intermittent every 6 hours  enalapril 5 milliGRAM(s) Oral two times a day      PHYSICAL EXAM:  Vital Signs Last 24 Hrs  T(C): 37.1 (02 Aug 2017 05:05), Max: 37.2 (01 Aug 2017 21:05)  T(F): 98.7 (02 Aug 2017 05:05), Max: 98.9 (01 Aug 2017 21:05)  HR: 83 (02 Aug 2017 10:14) (76 - 88)  BP: 142/67 (02 Aug 2017 10:14) (142/67 - 175/83)  BP(mean): --  RR: 16 (02 Aug 2017 05:05) (16 - 16)  SpO2: 97% (02 Aug 2017 05:05) (97% - 100%)    GENERAL: NAD, obese  HEAD:  Atraumatic, Normocephalic  EYES: EOMI, PERRLA, conjunctiva and sclera clear  ENT: moist mucous membranes  NECK: Supple, No JVD, Normal thyroid  NERVOUS SYSTEM:  Alert & Oriented X3, Good concentration; Motor Strength 5/5 B/L upper and lower extremities; DTRs 2+ intact and symmetric  CHEST/LUNG: No rales, rhonchi, wheezing, or rubs  HEART: Regular rate and rhythm; No murmurs, rubs, or gallops  ABDOMEN: Soft, Nontender, Nondistended; Bowel sounds present  EXTREMITIES:  2+ Peripheral Pulses, No clubbing, cyanosis, or edema  SKIN: No rashes or lesions    LABS:                        12.0   5.7   )-----------( 153      ( 02 Aug 2017 10:38 )             37.9     08-02    142  |  108  |  10  ----------------------------<  117<H>  3.5   |  27  |  0.83    Ca    8.6      02 Aug 2017 10:38  Phos  3.1     08-01  Mg     1.9     08-01    TPro  7.0  /  Alb  3.1<L>  /  TBili  0.4  /  DBili  x   /  AST  15  /  ALT  20  /  AlkPhos  94  08-01    LIVER FUNCTIONS - ( 01 Aug 2017 08:43 )  Alb: 3.1 g/dL / Pro: 7.0 g/dL / ALK PHOS: 94 U/L / ALT: 20 U/L DA / AST: 15 U/L / GGT: x             PT/INR - ( 31 Jul 2017 20:53 )   PT: 10.0 sec;   INR: 0.92 ratio       PTT - ( 31 Jul 2017 20:53 )  PTT:32.5 sec    Culture & Sensitivity:   CAPILLARY BLOOD GLUCOSE    RADIOLOGY & ADDITIONAL TESTS:  Radiology testing independently reviewed:  < from: CT Orbit w/ IV Cont (07.31.17 @ 22:09) >  IMPRESSION:   Mild right preseptal, inferior periorbital cellulitis. No evidence of a   postseptal cellulitis.    < end of copied text >       Consultant(s) Notes Reviewed:  [ x] YES  [ ] NO    Care Discussed with Consultants/Other Providers [ x] YES  [ ] NO

## 2017-08-02 NOTE — DISCHARGE NOTE ADULT - CARE PLAN
Principal Discharge DX:	Preseptal cellulitis of right eye  Goal:	Resolve infection  Instructions for follow-up, activity and diet:	Please take augmentin for 7 days, once in the morning and once at night to heal your infection. Please follow up with your PCP for evaluation of resolution of infection.  Secondary Diagnosis:	HTN (hypertension)  Goal:	Control blood pressure  Instructions for follow-up, activity and diet:	Please continue to take your home lopressor for Blood pressure control but also take your new medication of Enalapril 5mg twice a day. Please take your medications as prescribed and follow up with your PCP for regular blood pressure monitoring and dose adjustments. Also, please follow up with your PCP for repeat blood testing of your serum creatinine in 1 month's time. For reference, your baseline is 0.83.

## 2017-08-02 NOTE — DISCHARGE NOTE ADULT - MEDICATION SUMMARY - MEDICATIONS TO TAKE
I will START or STAY ON the medications listed below when I get home from the hospital:    colchicine 0.6 mg oral tablet  -- 1 tab(s) by mouth once a day Q1hrs up to maximum 3 doses daily  -- Do not take this drug if you are pregnant.  It is very important that you take or use this exactly as directed.  Do not skip doses or discontinue unless directed by your doctor.      -- Indication: For Gout    colchicine 0.6 mg oral capsule  -- 1 cap(s) by mouth 2 times a day, As Needed- for moderate pain  -- Do not take this drug if you are pregnant.  It is very important that you take or use this exactly as directed.  Do not skip doses or discontinue unless directed by your doctor.    -- Indication: For Gout    metoprolol 50 mg oral tablet, extended release  -- 1 tab(s) by mouth once a day  -- Indication: For Hypertension I will START or STAY ON the medications listed below when I get home from the hospital:    enalapril 5 mg oral tablet  -- 1 tab(s) by mouth 2 times a day  -- Indication: For HTN (hypertension)    colchicine 0.6 mg oral tablet  -- 1 tab(s) by mouth once a day Q1hrs up to maximum 3 doses daily  -- Do not take this drug if you are pregnant.  It is very important that you take or use this exactly as directed.  Do not skip doses or discontinue unless directed by your doctor.      -- Indication: For Pain    colchicine 0.6 mg oral capsule  -- 1 cap(s) by mouth 2 times a day, As Needed- for moderate pain  -- Do not take this drug if you are pregnant.  It is very important that you take or use this exactly as directed.  Do not skip doses or discontinue unless directed by your doctor.    -- Indication: For Pain    metoprolol 50 mg oral tablet, extended release  -- 1 tab(s) by mouth once a day  -- Indication: For Hypertension    amoxicillin-clavulanate 875 mg-125 mg oral tablet  -- 1 tab(s) by mouth 2 times a day  -- Finish all this medication unless otherwise directed by prescriber.  Take with food or milk.    -- Indication: For Periorbital cellulitis

## 2017-08-02 NOTE — DISCHARGE NOTE ADULT - PATIENT PORTAL LINK FT
“You can access the [facility] Patient Portal, offered by [facility], by registering with the following website: [email or web address]”

## 2017-08-02 NOTE — DISCHARGE NOTE ADULT - MEDICATION SUMMARY - MEDICATIONS TO STOP TAKING
I will STOP taking the medications listed below when I get home from the hospital:    Toprol-XL  --  by mouth    cephalexin 500 mg oral tablet  -- 1 tab(s) by mouth every 6 hours  -- Finish all this medication unless otherwise directed by prescriber.    predniSONE 50 mg oral tablet  -- 1 tab(s) by mouth once a day  -- It is very important that you take or use this exactly as directed.  Do not skip doses or discontinue unless directed by your doctor.  Obtain medical advice before taking any non-prescription drugs as some may affect the action of this medication.  Take with food or milk.

## 2017-08-02 NOTE — PROGRESS NOTE ADULT - SUBJECTIVE AND OBJECTIVE BOX
PGY 1 Note discussed with supervising resident and primary attending    Patient is a 62y old  Female who presents with a chief complaint of Right eye pain and swelling (01 Aug 2017 04:50).  Overnight no acute events were reported, and pt remained afebrile.  Today pt presents in no acute distress.  Pt states rt eye feels much better, and itching/burning have resolved.  Pt denies visual disturbances, or decreased ROM of rt eye.  Otherwise pt has no new complaints today and denies fever, CP, SOB, or N/V/D.      INTERVAL HPI/OVERNIGHT EVENTS: no new complaints    MEDICATIONS  (STANDING):  metoprolol succinate ER 50 milliGRAM(s) Oral daily  ampicillin/sulbactam  IVPB   IV Intermittent   ampicillin/sulbactam  IVPB 3 Gram(s) IV Intermittent every 6 hours  enalapril 5 milliGRAM(s) Oral two times a day  enalapril 2.5 milliGRAM(s) Oral once    MEDICATIONS  (PRN):  colchicine 0.6 milliGRAM(s) Oral two times a day PRN joint pain  __________________________________________________  REVIEW OF SYSTEMS:    CONSTITUTIONAL: No weakness, No fever  EYES/ENT: No visual changes, No reddness, or swelling;  No vertigo or throat pain   NECK: dryness of throat, No pain or stiffness  RESPIRATORY: No cough, wheezing, hemoptysis; No shortness of breath  CARDIOVASCULAR: No chest pain, mild palpitations  GASTROINTESTINAL: No abdominal or epigastric pain. No nausea, vomiting, or hematemesis; No diarrhea or constipation. No melena,  hematochezia.  GENITOURINARY: No dysuria, frequency or hematuria  NEUROLOGICAL: No numbness or weakness, left knee and foot pain  SKIN: No itching, burning, rashes, or lesions   All other review of systems is negative unless indicated above.    Vital Signs Last 24 Hrs  T(C): 37.1 (02 Aug 2017 05:05), Max: 37.2 (01 Aug 2017 21:05)  T(F): 98.7 (02 Aug 2017 05:05), Max: 98.9 (01 Aug 2017 21:05)  HR: 76 (02 Aug 2017 05:05) (76 - 88)  BP: 173/98 (02 Aug 2017 05:05) (162/95 - 175/83)  BP(mean): --  RR: 16 (02 Aug 2017 05:05) (16 - 16)  SpO2: 97% (02 Aug 2017 05:05) (97% - 100%)    Physical Exam:  GENERAL: Well developed, Well nourished white female, NAD HEENT:  Normocephalic/Atraumatic, reactive light reflex, erythematous Rt. conjunctiva, preserved eye movements NECK: Supple, no JVD RESP: Symmetric movement of the chest, clear to auscultation bilaterally, no wheeze, no rhonchi, no crackles noted CVS: S1 and S2 audible, no murmur, rubs or gallops noted GI: Normal active bowel sounds present, abdomen soft, non tender, non distended EXTREMITIES: no clubbing, cyanosis MSK: 5/5 strength bilateral upper and lower extremities, intact sensations to light touch PSYCH: Normal mood, normal affect observed NEURO: alert and oriented x 3, gait steady, speech clear, no focal deficits noted	      _________________________________________________  LABS:                 RADIOLOGY & ADDITIONAL TESTS:        Plan of care was discussed with patient and /or primary care giver; all questions and concerns were addressed and care was aligned with patient's wishes. PGY 1 Note discussed with supervising resident and primary attending    Patient is a 62y old  Female who presents with a chief complaint of Right eye pain and swelling (01 Aug 2017 04:50).  Overnight no acute events were reported, and pt remained afebrile.  Today pt presents in no acute distress.  Pt states rt eye feels much better, and itching/burning have resolved.  Pt denies visual disturbances, or decreased ROM of rt eye.  Otherwise pt has no new complaints today and denies fever, CP, SOB, or N/V/D.      INTERVAL HPI/OVERNIGHT EVENTS: no new complaints    MEDICATIONS  (STANDING):  metoprolol succinate ER 50 milliGRAM(s) Oral daily  ampicillin/sulbactam  IVPB   IV Intermittent   ampicillin/sulbactam  IVPB 3 Gram(s) IV Intermittent every 6 hours  enalapril 5 milliGRAM(s) Oral two times a day  enalapril 2.5 milliGRAM(s) Oral once    MEDICATIONS  (PRN):  colchicine 0.6 milliGRAM(s) Oral two times a day PRN joint pain  __________________________________________________  REVIEW OF SYSTEMS:    CONSTITUTIONAL: No weakness, No fever  EYES/ENT: No visual changes, No reddness, or swelling;  No vertigo or throat pain   NECK: dryness of throat, No pain or stiffness  RESPIRATORY: No cough, wheezing, hemoptysis; No shortness of breath  CARDIOVASCULAR: No chest pain, mild palpitations  GASTROINTESTINAL: No abdominal or epigastric pain. No nausea, vomiting, or hematemesis; No diarrhea or constipation. No melena,  hematochezia.  GENITOURINARY: No dysuria, frequency or hematuria  NEUROLOGICAL: No numbness or weakness, left knee and foot pain  SKIN: No itching, burning, rashes, or lesions   All other review of systems is negative unless indicated above.    Vital Signs Last 24 Hrs  T(C): 37.1 (02 Aug 2017 05:05), Max: 37.2 (01 Aug 2017 21:05)  T(F): 98.7 (02 Aug 2017 05:05), Max: 98.9 (01 Aug 2017 21:05)  HR: 76 (02 Aug 2017 05:05) (76 - 88)  BP: 173/98 (02 Aug 2017 05:05) (162/95 - 175/83)  BP(mean): --  RR: 16 (02 Aug 2017 05:05) (16 - 16)  SpO2: 97% (02 Aug 2017 05:05) (97% - 100%)    Physical Exam:  GENERAL: Well developed, Well nourished white female, NAD HEENT:  Normocephalic/Atraumatic, reactive light reflex, erythematous Rt. conjunctiva, preserved eye movements NECK: Supple, no JVD RESP: Symmetric movement of the chest, clear to auscultation bilaterally, no wheeze, no rhonchi, no crackles noted CVS: S1 and S2 audible, no murmur, rubs or gallops noted GI: Normal active bowel sounds present, abdomen soft, non tender, non distended EXTREMITIES: no clubbing, cyanosis MSK: 5/5 strength bilateral upper and lower extremities, intact sensations to light touch PSYCH: Normal mood, normal affect observed NEURO: alert and oriented x 3, gait steady, speech clear, no focal deficits noted	      _________________________________________________  LABS:    Blood cultures- NGTD               Plan of care was discussed with patient and /or primary care giver; all questions and concerns were addressed and care was aligned with patient's wishes. PGY 1 Note discussed with supervising resident and primary attending    Patient is a 62y old  Female who presents with a chief complaint of Right eye pain and swelling (01 Aug 2017 04:50).  Overnight no acute events were reported, and pt remained afebrile.  Today pt presents in no acute distress.  Pt states rt eye feels much better, and itching/burning have resolved.  Pt denies visual disturbances, or decreased ROM of rt eye.  Otherwise pt has no new complaints today and denies fever, CP, SOB, or N/V/D.      INTERVAL HPI/OVERNIGHT EVENTS: no new complaints    MEDICATIONS  (STANDING):  metoprolol succinate ER 50 milliGRAM(s) Oral daily  ampicillin/sulbactam  IVPB   IV Intermittent   ampicillin/sulbactam  IVPB 3 Gram(s) IV Intermittent every 6 hours  enalapril 5 milliGRAM(s) Oral two times a day  enalapril 2.5 milliGRAM(s) Oral once    MEDICATIONS  (PRN):  colchicine 0.6 milliGRAM(s) Oral two times a day PRN joint pain  __________________________________________________  REVIEW OF SYSTEMS:    CONSTITUTIONAL: No weakness, No fever  EYES/ENT: No visual changes, No reddness, or swelling;  No vertigo or throat pain   NECK: dryness of throat, No pain or stiffness  RESPIRATORY: No cough, wheezing, hemoptysis; No shortness of breath  CARDIOVASCULAR: No chest pain, mild palpitations  GASTROINTESTINAL: No abdominal or epigastric pain. No nausea, vomiting, or hematemesis; No diarrhea or constipation. No melena,  hematochezia.  GENITOURINARY: No dysuria, frequency or hematuria  NEUROLOGICAL: No numbness or weakness, left knee and foot pain  SKIN: No itching, burning, rashes, or lesions   All other review of systems is negative unless indicated above.    Vital Signs Last 24 Hrs  T(C): 37.1 (02 Aug 2017 05:05), Max: 37.2 (01 Aug 2017 21:05)  T(F): 98.7 (02 Aug 2017 05:05), Max: 98.9 (01 Aug 2017 21:05)  HR: 76 (02 Aug 2017 05:05) (76 - 88)  BP: 173/98 (02 Aug 2017 05:05) (162/95 - 175/83)  BP(mean): --  RR: 16 (02 Aug 2017 05:05) (16 - 16)  SpO2: 97% (02 Aug 2017 05:05) (97% - 100%)    Physical Exam:  GENERAL: Well developed, Well nourished white female, NAD HEENT:  Normocephalic/Atraumatic, reactive light reflex, erythematous Rt. conjunctiva, preserved eye movements NECK: Supple, no JVD RESP: Symmetric movement of the chest, clear to auscultation bilaterally, no wheeze, no rhonchi, no crackles noted CVS: S1 and S2 audible, no murmur, rubs or gallops noted GI: Normal active bowel sounds present, abdomen soft, non tender, non distended EXTREMITIES: no clubbing, cyanosis MSK: 5/5 strength bilateral upper and lower extremities, intact sensations to light touch PSYCH: Normal mood, normal affect observed NEURO: alert and oriented x 3, gait steady, speech clear, no focal deficits noted	      _________________________________________________  LABS:                          12.0   5.7   )-----------( 153      ( 02 Aug 2017 10:38 )             37.9       08-02    142  |  108  |  10  ----------------------------<  117<H>  3.5   |  27  |  0.83    Ca    8.6      02 Aug 2017 10:38  Phos  3.1     08-01  Mg     1.9     08-01    TPro  7.0  /  Alb  3.1<L>  /  TBili  0.4  /  DBili  x   /  AST  15  /  ALT  20  /  AlkPhos  94  08-01      Blood cultures- NGTD               Plan of care was discussed with patient and /or primary care giver; all questions and concerns were addressed and care was aligned with patient's wishes.

## 2017-08-06 LAB
CULTURE RESULTS: SIGNIFICANT CHANGE UP
CULTURE RESULTS: SIGNIFICANT CHANGE UP
SPECIMEN SOURCE: SIGNIFICANT CHANGE UP
SPECIMEN SOURCE: SIGNIFICANT CHANGE UP

## 2017-08-07 DIAGNOSIS — Z82.49 FAMILY HISTORY OF ISCHEMIC HEART DISEASE AND OTHER DISEASES OF THE CIRCULATORY SYSTEM: ICD-10-CM

## 2017-08-07 DIAGNOSIS — L03.213 PERIORBITAL CELLULITIS: ICD-10-CM

## 2017-08-07 DIAGNOSIS — I10 ESSENTIAL (PRIMARY) HYPERTENSION: ICD-10-CM

## 2017-08-07 DIAGNOSIS — F17.210 NICOTINE DEPENDENCE, CIGARETTES, UNCOMPLICATED: ICD-10-CM

## 2018-06-21 NOTE — PROGRESS NOTE ADULT - PROBLEM SELECTOR PROBLEM 2
Agency/Facility Name: [facility]   Spoke To: admin rep   Outcome: Patient is currently being followed. Due to no family present, facility. is pending a DPOA at this time.     BARBARA Gallo has been notified via Skype.    HTN (hypertension)

## 2019-09-28 ENCOUNTER — HOSPITAL ENCOUNTER (EMERGENCY)
Dept: HOSPITAL 62 - ER | Age: 64
Discharge: HOME | End: 2019-09-28
Payer: MEDICARE

## 2019-09-28 VITALS — SYSTOLIC BLOOD PRESSURE: 181 MMHG | DIASTOLIC BLOOD PRESSURE: 97 MMHG

## 2019-09-28 DIAGNOSIS — M10.9: ICD-10-CM

## 2019-09-28 DIAGNOSIS — F17.200: ICD-10-CM

## 2019-09-28 DIAGNOSIS — M25.532: Primary | ICD-10-CM

## 2019-09-28 DIAGNOSIS — R07.89: ICD-10-CM

## 2019-09-28 DIAGNOSIS — R29.91: ICD-10-CM

## 2019-09-28 DIAGNOSIS — M25.432: ICD-10-CM

## 2019-09-28 DIAGNOSIS — Z79.899: ICD-10-CM

## 2019-09-28 LAB
ADD MANUAL DIFF: NO
ALBUMIN SERPL-MCNC: 4.1 G/DL (ref 3.5–5)
ALP SERPL-CCNC: 79 U/L (ref 38–126)
ANION GAP SERPL CALC-SCNC: 8 MMOL/L (ref 5–19)
AST SERPL-CCNC: 25 U/L (ref 14–36)
BASOPHILS # BLD AUTO: 0.1 10^3/UL (ref 0–0.2)
BASOPHILS NFR BLD AUTO: 1.1 % (ref 0–2)
BILIRUB DIRECT SERPL-MCNC: 0.1 MG/DL (ref 0–0.4)
BILIRUB SERPL-MCNC: 0.5 MG/DL (ref 0.2–1.3)
BUN SERPL-MCNC: 12 MG/DL (ref 7–20)
CALCIUM: 9.5 MG/DL (ref 8.4–10.2)
CHLORIDE SERPL-SCNC: 102 MMOL/L (ref 98–107)
CO2 SERPL-SCNC: 29 MMOL/L (ref 22–30)
CRP SERPL-MCNC: 13.7 MG/L (ref ?–10)
EOSINOPHIL # BLD AUTO: 0.1 10^3/UL (ref 0–0.6)
EOSINOPHIL NFR BLD AUTO: 1.8 % (ref 0–6)
ERYTHROCYTE [DISTWIDTH] IN BLOOD BY AUTOMATED COUNT: 13.7 % (ref 11.5–14)
ERYTHROCYTE [SEDIMENTATION RATE] IN BLOOD: 31 MM/HR (ref 0–30)
GLUCOSE SERPL-MCNC: 110 MG/DL (ref 75–110)
HCT VFR BLD CALC: 42.1 % (ref 36–47)
HGB BLD-MCNC: 13.8 G/DL (ref 12–15.5)
LYMPHOCYTES # BLD AUTO: 0.9 10^3/UL (ref 0.5–4.7)
LYMPHOCYTES NFR BLD AUTO: 14.1 % (ref 13–45)
MCH RBC QN AUTO: 27.8 PG (ref 27–33.4)
MCHC RBC AUTO-ENTMCNC: 32.8 G/DL (ref 32–36)
MCV RBC AUTO: 85 FL (ref 80–97)
MONOCYTES # BLD AUTO: 0.6 10^3/UL (ref 0.1–1.4)
MONOCYTES NFR BLD AUTO: 9.1 % (ref 3–13)
NEUTROPHILS # BLD AUTO: 4.8 10^3/UL (ref 1.7–8.2)
NEUTS SEG NFR BLD AUTO: 73.9 % (ref 42–78)
PLATELET # BLD: 182 10^3/UL (ref 150–450)
POTASSIUM SERPL-SCNC: 4.1 MMOL/L (ref 3.6–5)
PROT SERPL-MCNC: 7.2 G/DL (ref 6.3–8.2)
RBC # BLD AUTO: 4.97 10^6/UL (ref 3.72–5.28)
TOTAL CELLS COUNTED % (AUTO): 100 %
URATE SERPL-MCNC: 8 MG/DL (ref 2.5–7.5)
WBC # BLD AUTO: 6.5 10^3/UL (ref 4–10.5)

## 2019-09-28 PROCEDURE — 85652 RBC SED RATE AUTOMATED: CPT

## 2019-09-28 PROCEDURE — 85025 COMPLETE CBC W/AUTO DIFF WBC: CPT

## 2019-09-28 PROCEDURE — 86140 C-REACTIVE PROTEIN: CPT

## 2019-09-28 PROCEDURE — 73110 X-RAY EXAM OF WRIST: CPT

## 2019-09-28 PROCEDURE — 99284 EMERGENCY DEPT VISIT MOD MDM: CPT

## 2019-09-28 PROCEDURE — 93010 ELECTROCARDIOGRAM REPORT: CPT

## 2019-09-28 PROCEDURE — 84550 ASSAY OF BLOOD/URIC ACID: CPT

## 2019-09-28 PROCEDURE — 36415 COLL VENOUS BLD VENIPUNCTURE: CPT

## 2019-09-28 PROCEDURE — 93005 ELECTROCARDIOGRAM TRACING: CPT

## 2019-09-28 PROCEDURE — 71046 X-RAY EXAM CHEST 2 VIEWS: CPT

## 2019-09-28 PROCEDURE — 80053 COMPREHEN METABOLIC PANEL: CPT

## 2019-09-28 PROCEDURE — 84484 ASSAY OF TROPONIN QUANT: CPT

## 2019-09-28 NOTE — ER DOCUMENT REPORT
ED General





- General


Chief Complaint: Wrist Pain


Stated Complaint: CHEST PAIN


Time Seen by Provider: 09/28/19 13:33


Mode of Arrival: Ambulatory





- HPI


Notes: 





Patient presents with multiple complaints.  Her primary complaint for coming in 

is that since Thursday of this week she is been having wrist pain and minor 

swelling of the left wrist.  She does have a history of gout and is on 

colchicine.  She denies any recent nausea vomiting or fevers.  She denies any 

trauma or falls or any incidents to cause this pain.  She states that years ago 

she had a similar incident in her right wrist that resolved by itself.  Is no 

history of autoimmune diseases that she is aware of.  She also states for the 

last 2 months she has been having intermittent chest discomfort in her left 

upper chest wall that goes into her trapezius muscle.  Eyes any actual chest 

pain or shortness of breath with this.  Is not exertional.





- Related Data


Allergies/Adverse Reactions: 


                                        





No Known Allergies Allergy (Verified 09/28/19 13:30)


   











Past Medical History





- General


Information source: Patient





- Social History


Smoking Status: Current Some Day Smoker


Family History: Reviewed & Not Pertinent


Patient has suicidal ideation: No


Patient has homicidal ideation: No





Review of Systems





- Review of Systems


Constitutional: No symptoms reported


EENT: No symptoms reported


Cardiovascular: See HPI


Respiratory: No symptoms reported


Gastrointestinal: No symptoms reported


Genitourinary: No symptoms reported


Female Genitourinary: No symptoms reported


Musculoskeletal: See HPI


Skin: No symptoms reported


Hematologic/Lymphatic: No symptoms reported


Neurological/Psychological: No symptoms reported





Physical Exam





- Vital signs


Vitals: 


                                        











Temp Pulse Resp BP Pulse Ox


 


 99.6 F   75   14   161/99 H  96 


 


 09/28/19 13:12  09/28/19 13:12  09/28/19 13:12  09/28/19 13:12  09/28/19 13:12














- General


General appearance: Appears well, Alert





- HEENT


Head: Normocephalic, Atraumatic


Eyes: Normal


Pupils: PERRL





- Respiratory


Respiratory status: No respiratory distress


Chest status: Nontender


Breath sounds: Normal


Chest palpation: Other - No tenderness to palpation erythema or induration of 

the affected area of left upper chest wall





- Cardiovascular


Rhythm: Regular


Heart sounds: Normal auscultation


Murmur: No





- Abdominal


Inspection: Normal


Distension: No distension


Bowel sounds: Normal


Tenderness: Nontender


Organomegaly: No organomegaly





- Extremities


General upper extremity: Other - Left wrist area appears mildly swollen compared

to right there is pain with movement of the wrist but no warmth or erythema to 

suggest infection.  No cuts or scrapes on her body.


General lower extremity: Normal inspection, Normal ROM





Course





- Re-evaluation


Re-evalutation: 





09/28/19 17:04


She is not diabetic she has not had any recent fevers or illnesses she has 

mildly elevated inflammatory markers but not suggestive of any joint.  She has 

no warmth or erythema of the wrist.  Similar symptoms happened before several 

years ago to her right wrist that resolved spontaneously.  She does not have any

autoimmune disorders that she is aware of.  However, she does have a history of 

gout and is on colchicine.


Will add 5 days of steroids with strict return precautions of any worsening of 

symptoms or any erythema develops or fevers return immediately to the emergency 

department.  In regards to her chest and trapezius discomfort this is been going

on and off for several months without any incident and without any triggers.  

Appears possibly musculoskeletal in nature she had negative work-up with no 

concerning findings on chest x-ray, negative troponin, and EKG shows no 

concerning findings.


Recently moved to the area and is establishing primary care with Dr. Khan. 

Return precautions provided





- Vital Signs


Vital signs: 


                                        











Temp Pulse Resp BP Pulse Ox


 


 99.6 F   75   17   181/97 H  100 


 


 09/28/19 13:12  09/28/19 13:12  09/28/19 17:24  09/28/19 17:25  09/28/19 17:24














- Laboratory


Result Diagrams: 


                                 09/28/19 14:03





                                 09/28/19 14:03


Laboratory results interpreted by me: 


                                        











  09/28/19 09/28/19





  14:03 14:03


 


ESR  31 H 


 


Uric Acid   8.0 H


 


C-Reactive Protein   13.7 H














- Diagnostic Test


Radiology reviewed: Reports reviewed





- EKG Interpretation by Me


EKG shows normal: Sinus rhythm


Rate: Normal


Rhythm: NSR - Left axis deviation, no concerning ST depressions or elevations, 

normal intervals





Discharge





- Discharge


Clinical Impression: 


 Chest and trapezius discomfort, Left wrist pain and swelling





Condition: Good


Disposition: HOME, SELF-CARE


Instructions:  Oral Narcotic Medication ([facility])


Additional Instructions: 


Please take medications as prescribed.  If you begin to develop any worsening 

symptoms including any cysts increased swelling redness or fevers despite 

medications being used please return immediately to the emergency department for

reevaluation


Prescriptions: 


Prednisone [Deltasone 20 mg Tablet] 20 mg PO DAILY 5 Days #10 tablet


Oxycodone HCl/Acetaminophen [Percocet 5-325 mg Tablet] 1 tab PO ASDIR PRN #5 

tablet


 PRN Reason:

## 2019-09-28 NOTE — ER DOCUMENT REPORT
ED Medical Screen (RME)





- General


Chief Complaint: Wrist Pain


Stated Complaint: CHEST PAIN


Time Seen by Provider: 09/28/19 13:33


Mode of Arrival: Ambulatory


Information source: Patient


Notes: 





Patient presents complaining of left-sided chest pain that she describes as a 

dull ache that started 2 days ago.  Patient also presents with left wrist pain 

and swelling for the past 2 days as well.  Patient suspected that the 2 were 

related.  Patient denies any fever.  Patient denies any injury to the left 

wrist.  Patient is right-hand dominant.  Patient does have erythema to the left 

wrist area.  Patient has a history of gout although does not typically have gout

in the wrist area and does take colchicine daily prophylactically for gout.





hx: Hypertension, gout





I have greeted and performed a rapid initial assessment of this patient.  A 

comprehensive ED assessment and evaluation of the patient, analysis of test 

results and completion of the medical decision making process will be conducted 

by additional ED providers.





- Related Data


Allergies/Adverse Reactions: 


                                        





No Known Allergies Allergy (Verified 09/28/19 13:30)


   











Physical Exam





- Vital signs


Vitals: 





                                        











Temp Pulse Resp BP Pulse Ox


 


 99.6 F   75   14   161/99 H  96 


 


 09/28/19 13:12  09/28/19 13:12  09/28/19 13:12  09/28/19 13:12  09/28/19 13:12














- General


General appearance: Alert


Notes: 





Left wrist swelling with erythema to volar aspect of wrist, questionable 

erythema to dorsal aspect although patient had been wearing a wrist brace that 

was removed.  Area exquisitely tender with any range of motion





Course





- Vital Signs


Vital signs: 





                                        











Temp Pulse Resp BP Pulse Ox


 


 99.6 F   75   14   161/99 H  96 


 


 09/28/19 13:12  09/28/19 13:12  09/28/19 13:12  09/28/19 13:12  09/28/19 13:12

## 2019-09-28 NOTE — RADIOLOGY REPORT (SQ)
EXAM DESCRIPTION:  WRIST LEFT 3 VIEWS



COMPLETED DATE/TIME:  9/28/2019 2:30 pm



REASON FOR STUDY:  L wrist pain, swelling



COMPARISON:  None.



NUMBER OF VIEWS:  Three views.



TECHNIQUE:  AP, lateral, and oblique radiographic images acquired of the left wrist.



LIMITATIONS:  None.



FINDINGS:  MINERALIZATION: Normal.

BONES: No acute fracture or dislocation.  No worrisome bone lesions.  Normal alignment.

SOFT TISSUES: No soft tissue swelling.  No foreign body.

OTHER: No other significant finding.



IMPRESSION:  NEGATIVE STUDY OF THE LEFT WRIST. NO RADIOGRAPHIC EVIDENCE OF ACUTE INJURY.



TECHNICAL DOCUMENTATION:  JOB ID:  [id number]

 2011 [employer]- All Rights Reserved



Reading location - IP/workstation name: [email or web address]

## 2019-09-28 NOTE — RADIOLOGY REPORT (SQ)
EXAM DESCRIPTION:  CHEST 2 VIEWS



COMPLETED DATE/TIME:  9/28/2019 2:30 pm



REASON FOR STUDY:  cp



COMPARISON:  None.



EXAM PARAMETERS:  NUMBER OF VIEWS: two views

TECHNIQUE: Digital Frontal and Lateral radiographic views of the chest acquired.

RADIATION DOSE: NA

LIMITATIONS: none



FINDINGS:  LUNGS AND PLEURA: No opacities, masses or pneumothorax. No pleural effusion.

MEDIASTINUM AND HILAR STRUCTURES: No masses or contour abnormalities.

HEART AND VASCULAR STRUCTURES: Heart normal size.  No evidence for failure.

BONES: No acute findings.

HARDWARE: None in the chest.

OTHER: No other significant finding.



IMPRESSION:  NO ACUTE RADIOGRAPHIC FINDING IN THE CHEST.



TECHNICAL DOCUMENTATION:  JOB ID:  [id number]

 2011 [employer]- All Rights Reserved



Reading location - IP/workstation name: [email or web address]

## 2019-09-30 NOTE — EKG REPORT
SEVERITY:- ABNORMAL ECG -

SINUS RHYTHM

PROBABLE LEFT ATRIAL ABNORMALITY

LEFT VENTRICULAR HYPERTROPHY

BORDERLINE T ABNORMALITIES, INFERIOR LEADS

:

Confirmed by: Daniel Joyner 30-Sep-2019 00:52:58 normal (ped)...

## 2020-02-13 ENCOUNTER — HOSPITAL ENCOUNTER (OUTPATIENT)
Dept: HOSPITAL 62 - RAD | Age: 65
End: 2020-02-13
Attending: INTERNAL MEDICINE
Payer: MEDICARE

## 2020-02-13 DIAGNOSIS — R94.31: Primary | ICD-10-CM

## 2020-02-13 PROCEDURE — 78452 HT MUSCLE IMAGE SPECT MULT: CPT

## 2020-02-13 PROCEDURE — A9500 TC99M SESTAMIBI: HCPCS

## 2020-02-13 PROCEDURE — 93017 CV STRESS TEST TRACING ONLY: CPT

## 2020-02-13 NOTE — DRAGON STRESS TEST REPORT
INTRAVENOUS LEXISCAN CARDIOLITE STRESS TEST USING SINGLE PHOTON EMMISION 
COMPUTERIZED TOMOGRAPHIC.



DATE OF PROCEDURE: February 13, 2020. 

INDICATION : Chest pain



CARDIAC RISK FACTORS: Hypertension, tobacco abuse



RESTING EKG: Sinus rhythm, no acute ST-T wave changes are noted

STRESS EKG: No significant ST segment changes noted with LexiScan bolus 



REASON FOR TERMINATION: Protocol.





PROCEDURE REPORT: Baseline heart rate 88 beats per minute with blood pressure of
143/81.  Patient had no significant complaints.  

Patient was bolused with Lexiscan 0.4 mg intravenously followed by saline bolus.

Heart rate at 2 minutes post bolus 103 with a blood pressure of 160/82.  

3 minutes post bolus heart rate 96 with blood pressure of 161/90.  

No significant EKG changes were noted.  Patient had no significant complaints 
during the procedure or postprocedure. 





CONCLUSIONS: Normal EKG and hemodynamic response to IV LexiScan.







NUCLEAR DATA: 

At rest the patient was given 14.80 millicuries of technetium 99 sestamibi 
injected intravenously.  As per protocol rest gated SPECT images were obtained. 


On day of stress test, the patient was given intravenous LexiScan at a dose of 
0.4 mg in 5 mL intravenously, followed by flush with normal saline.  
Subsequently the stress dose of 43.3 millicuries of technetium 99 sestamibi was 
injected intravenously.  As per protocol stress gated images were obtained.  



NUCLEAR INTERPRETATION: Both raw and processed data were used for 
interpretation.  Visual, qualitative, computer-generated quantitative data was 
used.  There was good myocardial uptake of technetium compound.  Motion artifact
and soft tissue attenuations were noted.  Increased visceral uptake was noted.  
No definitive areas of transient perfusion defect noted, No definitive areas of 
fixed perfusion defect or scars noted.



EKG gated imaging showed LV EF at 64 %, rest and stress gated EF similar 
visually.  T. I D. ratio was 1.29.  Lung heart ratio noted to be within normal 
limits 0.26.  No significant extracardiac and abnormal radiotracer activities 
were noted.  RV free wall uptake was noted to be WNL.



IMPRESSION: Also refer to comments under nuclear interpretation. Also test 
results needs to be interpreted in the context of pretest probability.





1.  No definitive areas of transient perfusion defect noted, except for a small 
area in the distal anterior wall, felt to be artifactual related to differences 
in breast attenuation artifact but cannot rule out an area of mild ischemia.  
Clinical correlation is requested.  SDS score is 1.

2.  There is no definitive scintigraphic evidence of myocardial infarction/scar.

3.  EKG gated imaging shows left ventricular ejection fraction of approx. 64 %. 

4.  Mild transient ischemic dilatation noted.  Based on most recent studies, 
felt to be not significant in the absence of significant perfusion abnormalities
however clinical correlation requested as worse disease and or balanced ischemia
could be missed. In approximately 10% of the cases Lexiscan may not cause 
adequate vasodilatory stress.



RECOMMENDATIONS: 

Aggressive risk factor modification and medical management.  Further evaluation 
may be needed if continued symptoms or other high risk indicators are noted on 
clinical evaluation.  May consider stress echo if clinically indicated.

Close cardiology follow-up is also recommended.

Inability to exercise by itself can lead to increased cardiovascular event 
risks.

Consider cardiology consultation and or follow-up if clinically indicated.

I am available for cardiology evaluation and consultation if requested by the 
primary care MD, unless patient already has a cardiologist.





Dr. ZOHRA Joyner.  MD MRCP

Board certified in cardiology and sleep medicine.

Board certified in nuclear cardiology, adult echocardiography.

[place]

## 2020-06-16 ENCOUNTER — HOSPITAL ENCOUNTER (OUTPATIENT)
Dept: HOSPITAL 62 - RAD | Age: 65
End: 2020-06-16
Attending: INTERNAL MEDICINE
Payer: MEDICARE

## 2020-06-16 DIAGNOSIS — G45.9: ICD-10-CM

## 2020-06-16 DIAGNOSIS — G45.3: Primary | ICD-10-CM

## 2020-06-16 PROCEDURE — 70551 MRI BRAIN STEM W/O DYE: CPT

## 2020-06-16 PROCEDURE — 93880 EXTRACRANIAL BILAT STUDY: CPT

## 2020-06-16 NOTE — RADIOLOGY REPORT (SQ)
EXAM DESCRIPTION:  MRI HEAD WITHOUT



IMAGES COMPLETED DATE/TIME:  6/16/2020 10:26 am



REASON FOR STUDY:  AMAUROSIS FUGAX (G45.3), TRANSIENT CEREBRAL ISCHEMIC ATTACK, UNSPECIFIED (G G45.9 
 TRANSIENT CEREBRAL ISCHEMIC ATTACK, UNSPECIFIED G45.3  AMAUROSIS FUGAX



COMPARISON:  None.



TECHNIQUE:  Multiplanar imaging includes non-contrasted T1, T2, FLAIR, and diffusion with ADC map seq
uences. Images stored on PACS.



LIMITATIONS:  None.



FINDINGS:  ANATOMY: No anomalies. Normal vascular flow voids. Pituitary fossa normal.

CSF SPACES: Normal in size and contour. No hemorrhage.

CEREBRUM: Sulci and gyri normal in size and contour. Normal white matter signal on FLAIR imaging.  No
 evidence of hemorrhage, mass, or extraaxial fluid collection.

POSTERIOR FOSSA: No signal alteration. No hemorrhage. No edema, masses or mass effect.  Internal charlene
tory canals, cerebello-pontine angles, mastoids normal.

DIFFUSION IMAGING: Negative for acute or sub-acute infarction.

ORBITS: No masses. Globes normal.

PARANASAL  SINUSES: No fluid levels.  Mucosa normal.

OTHER: No other significant finding.



IMPRESSION:  NORMAL MRI OF THE BRAIN WITHOUT INTRAVENOUS GADOLINIUM CONTRAST.

EVIDENCE OF ACUTE STROKE: NO.



TECHNICAL DOCUMENTATION:  JOB ID:  [id number]

 2011 [employer]- All Rights Reserved



Reading location - IP/workstation name: [email or web address]

## 2020-06-16 NOTE — RADIOLOGY REPORT (SQ)
EXAM DESCRIPTION:  CAROTID DOPPLER



IMAGES COMPLETED DATE/TIME:  6/16/2020 10:23 am



REASON FOR STUDY:  TIA G45.9  TRANSIENT CEREBRAL ISCHEMIC ATTACK, UNSPECIFIED G45.3  AMAUROSIS FUGAX



COMPARISON:  None.



TECHNIQUE:  Grayscale ultrasound, Doppler velocity and spectra, and color Doppler images acquired of 
the extra-cranial carotid and vertebral arteries. Images stored on PACS.



LIMITATIONS:  None.



FINDINGS:  RIGHT CAROTID

CCA Velocities: Within normal limits.

ICA Velocities

Peak systolic 148 cm/s.

End diastolic 48 cm/s.

Proximal ICA/CCA peak systolic ratio 2.1.

Tortuous CCA and ICA.  Less than 50% stenosis of the proximal right ICA.

LEFT CAROTID

CCA Velocities: Within normal limits.

ICA Velocities

Peak systolic 57 cm/s.

End diastolic 20 cm/s.

Proximal ICA/CCA peak systolic ratio 1.2.

Tortuous CCA and ICA

VERTEBRAL ARTERIES: Antegrade flow.  Normal waveforms.

SUBCLAVIAN ARTERIES: No finding.

OTHER: No other significant finding.



IMPRESSION:  NO HEMODYNAMICALLY SIGNIFICANT STENOSIS.



COMMENT:  Quality ID #195:  Velocity criteria are extrapolated from the diameter data as defined by t
he Society of Radiologists in Ultrasound Consensus Conference. Radiology 2003: 229; 340-346.



TECHNICAL DOCUMENTATION:  JOB ID:  [id number]

 2011 [employer]- All Rights Reserved



Reading location - IP/workstation name: [email or web address]

## 2020-09-16 ENCOUNTER — HOSPITAL ENCOUNTER (OUTPATIENT)
Dept: HOSPITAL 62 - RAD | Age: 65
End: 2020-09-16
Attending: INTERNAL MEDICINE
Payer: MEDICARE

## 2020-09-16 DIAGNOSIS — R10.30: Primary | ICD-10-CM

## 2020-09-16 PROCEDURE — 82565 ASSAY OF CREATININE: CPT

## 2020-09-16 PROCEDURE — 74178 CT ABD&PLV WO CNTR FLWD CNTR: CPT

## 2020-09-16 NOTE — RADIOLOGY REPORT (SQ)
EXAM DESCRIPTION:  CT ABD/PELVIS COMBO



IMAGES COMPLETED DATE/TIME:  9/16/2020 4:07 pm



REASON FOR STUDY:  (R10.30)LOWER ABDOMINAL PAIN, UNSPECIFIED R10.30  LOWER ABDOMINAL PAIN, UNSPECIFIE
D



COMPARISON:  None.



TECHNIQUE:  CT scan of the abdomen and pelvis performed with and without intravenous contrast, and wi
thout oral contrast. Contrasted imaging performed helical scanning technique and dynamic intravenous 
contrast injection. Images reviewed with lung, soft tissue, and bone windows. Reconstructed coronal a
nd sagittal MPR images reviewed. Delayed images for evaluation of the urinary system also acquired. A
ll images stored on PACS.

All CT scanners at this facility use dose modulation, iterative reconstruction, and/or weight based d
osing when appropriate to reduce radiation dose to as low as reasonably achievable (ALARA).

[facility]: Dose Right  [facility]: CareDose    [facility]: Dose Right    [facility]: Teradose 4D    [facility]: Smart Technologies



CONTRAST TYPE AND DOSE:  contrast/concentration: Isovue 350.00 mmol/ml; Total Contrast Delivered: 75.
0 ml; Total Saline Delivered: 55.0 ml



RENAL FUNCTION:  Creatinine 1.0



RADIATION DOSE:  CT Rad equipment meets quality standard of care and radiation dose reduction techniq
ues were employed. CTDIvol: 10.6 - 11.6 mGy. DLP: 1581 mGy-cm. .



LIMITATIONS:  None.



FINDINGS:  NON-CONTRASTED IMAGING: No significant renal or bladder calcifications. No other significa
nt organ calcifications.

POST-CONTRASTED IMAGING:

LOWER CHEST: No significant findings. No nodules or infiltrates.

LIVER: Normal size. No masses.  No dilated ducts.

SPLEEN: Normal size. No focal lesions.

PANCREAS: No masses. No significant calcifications. No adjacent inflammation or peripancreatic fluid 
collections. Pancreatic duct not dilated.

GALLBLADDER: No identified stones by CT criteria. No inflammatory changes to suggest cholecystitis.

ADRENAL GLANDS: No significant masses or asymmetry.

RIGHT KIDNEY AND URETER: No solid masses.   No significant calcifications.   No hydronephrosis or hyd
roureter.

LEFT KIDNEY AND URETER: No solid masses.   No significant calcifications.   No hydronephrosis or hydr
oureter.

AORTA AND VESSELS: No aneurysm. No dissection. Renal arteries, SMA, celiac without stenosis.

RETROPERITONEUM: No retroperitoneal adenopathy, hemorrhage or masses.

BOWEL AND PERITONEAL CAVITY: Diverticular change.  No acute diverticulitis.  Scattered small mesenter
ic lymph nodes.  These are nonpathologic based on size.

APPENDIX: Normal.

PELVIS: No mass.  No free fluid. Normal bladder.

ABDOMINAL WALL: No masses. No hernias.

BONES: Slight anterolisthesis of L4 on L5.  Slight retrolisthesis of L5 on S1.

OTHER: No other significant finding.



IMPRESSION:  Diverticular change within the sigmoid colon.  No acute diverticulitis.  Scattered small
 mesenteric nodes which are nonspecific.  These are not pathologic based on size criteria.

Mild degenerative changes in the lumbar spine.



TECHNICAL DOCUMENTATION:  JOB ID:  [id number]

Quality ID # 436: Final reports with documentation of one or more dose reduction techniques (e.g., Au
tomated exposure control, adjustment of the mA and/or kV according to patient size, use of iterative 
reconstruction technique)

 2011 [employer]- All Rights Reserved



Reading location - IP/workstation name: [email or web address]

## 2020-11-13 ENCOUNTER — HOSPITAL ENCOUNTER (OUTPATIENT)
Dept: HOSPITAL 62 - RAD | Age: 65
End: 2020-11-13
Attending: INTERNAL MEDICINE
Payer: MEDICARE

## 2020-11-13 DIAGNOSIS — M25.831: ICD-10-CM

## 2020-11-13 DIAGNOSIS — M25.431: ICD-10-CM
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
## 2020-11-13 DIAGNOSIS — M25.531: ICD-10-CM

## 2020-11-13 DIAGNOSIS — S63.071A: Primary | ICD-10-CM

## 2020-11-13 NOTE — RADIOLOGY REPORT (SQ)
EXAM DESCRIPTION:  MRI RT UPPER JOINT WITHOUT



IMAGES COMPLETED DATE/TIME:  11/13/2020 9:08 am



REASON FOR STUDY:  M25.531 PAIN IN RIGHT WRIST M25.531  PAIN IN RIGHT WRIST.  Decreased range of caro
on, pain, swelling, weakness, decreased  strength for several weeks.  No reported injury.



COMPARISON:  None



TECHNIQUE:  Right wrist images acquired and stored on PACS.  Multiplanar images include fat sensitive
 sequences as T1, fluid sensitive sequences as FST2/STIR, cartilage sensitive sequences as FSPD, grad
ient echo sequences.



LIMITATIONS:  None.



FINDINGS:  BONE MARROW: There is subchondral cystic change in the lunate, capitate, trapezium, scapho
id, and trapezoid bones, consistent with chronic degenerative change.  There is also subchondral cyst
ic change and sclerosis at the ulnar styloid process and distal radius.  Joint space narrowing with s
mall marginal osteophytes at the 1st digit carpometacarpal joint.  No acute fracture or cortical disr
uption.

CARPAL ALIGNMENT AND ARTICULATION: There is ulnar minus variance approximately 5 mm.  Normal alignmen
t at the carpus.

EFFUSION: There is a large multiloculated wrist joint effusion particularly ulna ulnar aspect.

SCAPHOLUNATE LIGAMENT: Abnormal signal consistent with chronic tear.

LUNATE-TRIQUETRAL LIGAMENT: Abnormal signal and truncation consistent with chronic tear.

TFC COMPLEX: Not well visualized.  Abnormal signal in the region.

EXTRINSIC LIGAMENTS AND DISTAL RADIO-ULNAR JOINT: There is dorsal subluxation of the distal ulna.

1-6 EXTENSOR COMPARTMENTS: Normal. Specifically no tendinopathy of the abductor pollicis longus or ex
tensor pollicis brevis to suggest de Quervain's Syndrome.

CARPAL TUNNEL AND MEDIAN NERVE: Normal volume and morphology of the carpal tunnel proximally at the l
evel of the radiocarpal joint and distally at the hook of the hamate. No thickening or signal alterat
ion of the median nerve.

OTHER: No other significant finding.



IMPRESSION:  Large multiloculated wrist joint effusion.  Chronic ulnar minus variance and subluxation
, with chronic degenerative changes at the bones of the mid carpus and distal ulna.  Chronic ligament
ous injuries and abnormal signal in the ligaments about the wrist consistent with chronic posttraumat
ic change.



TECHNICAL DOCUMENTATION:  JOB ID:  [id number]

 [employer]- All Rights Reserved



Reading location - IP/workstation name: [email or web address]

## 2021-01-13 ENCOUNTER — HOSPITAL ENCOUNTER (OUTPATIENT)
Dept: HOSPITAL 62 - LAB | Age: 66
End: 2021-01-13
Attending: ORTHOPAEDIC SURGERY
Payer: MEDICARE

## 2021-01-13 DIAGNOSIS — M25.531: Primary | ICD-10-CM

## 2021-01-13 LAB
ADD MANUAL DIFF: NO
BASOPHILS # BLD AUTO: 0.1 10^3/UL (ref 0–0.2)
BASOPHILS NFR BLD AUTO: 1.1 % (ref 0–2)
CRP SERPL-MCNC: 28.7 MG/L (ref ?–10)
EOSINOPHIL # BLD AUTO: 0.2 10^3/UL (ref 0–0.6)
EOSINOPHIL NFR BLD AUTO: 3.6 % (ref 0–6)
ERYTHROCYTE [DISTWIDTH] IN BLOOD BY AUTOMATED COUNT: 17.2 % (ref 11.5–14)
ERYTHROCYTE [SEDIMENTATION RATE] IN BLOOD: 58 MM/HR (ref 0–30)
HCT VFR BLD CALC: 35.5 % (ref 36–47)
HGB BLD-MCNC: 11.3 G/DL (ref 12–15.5)
LYMPHOCYTES # BLD AUTO: 1.1 10^3/UL (ref 0.5–4.7)
LYMPHOCYTES NFR BLD AUTO: 20.7 % (ref 13–45)
MCH RBC QN AUTO: 25.4 PG (ref 27–33.4)
MCHC RBC AUTO-ENTMCNC: 31.9 G/DL (ref 32–36)
MCV RBC AUTO: 80 FL (ref 80–97)
MONOCYTES # BLD AUTO: 0.3 10^3/UL (ref 0.1–1.4)
MONOCYTES NFR BLD AUTO: 6.4 % (ref 3–13)
NEUTROPHILS # BLD AUTO: 3.5 10^3/UL (ref 1.7–8.2)
NEUTS SEG NFR BLD AUTO: 68.2 % (ref 42–78)
PLATELET # BLD: 227 10^3/UL (ref 150–450)
RBC # BLD AUTO: 4.45 10^6/UL (ref 3.72–5.28)
TOTAL CELLS COUNTED % (AUTO): 100 %
URATE SERPL-MCNC: 7.7 MG/DL (ref 2.5–7.5)
WBC # BLD AUTO: 5.1 10^3/UL (ref 4–10.5)

## 2021-01-13 PROCEDURE — 86200 CCP ANTIBODY: CPT

## 2021-01-13 PROCEDURE — 86140 C-REACTIVE PROTEIN: CPT

## 2021-01-13 PROCEDURE — 86038 ANTINUCLEAR ANTIBODIES: CPT

## 2021-01-13 PROCEDURE — 36415 COLL VENOUS BLD VENIPUNCTURE: CPT

## 2021-01-13 PROCEDURE — 85025 COMPLETE CBC W/AUTO DIFF WBC: CPT

## 2021-01-13 PROCEDURE — 84550 ASSAY OF BLOOD/URIC ACID: CPT

## 2021-01-13 PROCEDURE — 85652 RBC SED RATE AUTOMATED: CPT

## 2021-01-13 PROCEDURE — 86431 RHEUMATOID FACTOR QUANT: CPT

## 2022-12-29 NOTE — PATIENT PROFILE ADULT. - NS PRO ABUSE SCREEN SUSPICION NEGLECT YN
no Hypertriglyceridemia Monitoring: I explained this is common when taking isotretinoin. If this worsens they will contact us.